# Patient Record
Sex: MALE | Race: WHITE | Employment: FULL TIME | URBAN - NONMETROPOLITAN AREA
[De-identification: names, ages, dates, MRNs, and addresses within clinical notes are randomized per-mention and may not be internally consistent; named-entity substitution may affect disease eponyms.]

---

## 2019-02-05 ENCOUNTER — OFFICE VISIT (OUTPATIENT)
Dept: UROLOGY | Age: 64
End: 2019-02-05
Payer: COMMERCIAL

## 2019-02-05 VITALS
HEIGHT: 70 IN | WEIGHT: 209 LBS | DIASTOLIC BLOOD PRESSURE: 70 MMHG | BODY MASS INDEX: 29.92 KG/M2 | SYSTOLIC BLOOD PRESSURE: 132 MMHG

## 2019-02-05 DIAGNOSIS — R97.20 ELEVATED PSA: Primary | ICD-10-CM

## 2019-02-05 LAB
BILIRUBIN URINE: NEGATIVE
BLOOD URINE, POC: NEGATIVE
CHARACTER, URINE: CLEAR
COLOR, URINE: YELLOW
GLUCOSE URINE: NEGATIVE MG/DL
KETONES, URINE: NEGATIVE
LEUKOCYTE CLUMPS, URINE: NEGATIVE
NITRITE, URINE: NEGATIVE
PH, URINE: 5.5
PROTEIN, URINE: NEGATIVE MG/DL
SPECIFIC GRAVITY, URINE: 1.01 (ref 1–1.03)
UROBILINOGEN, URINE: 0.2 EU/DL

## 2019-02-05 PROCEDURE — 99202 OFFICE O/P NEW SF 15 MIN: CPT | Performed by: UROLOGY

## 2019-02-05 PROCEDURE — 81003 URINALYSIS AUTO W/O SCOPE: CPT | Performed by: UROLOGY

## 2019-02-05 ASSESSMENT — ENCOUNTER SYMPTOMS
CHEST TIGHTNESS: 1
EYE PAIN: 0
PHOTOPHOBIA: 0
VOMITING: 0
SHORTNESS OF BREATH: 0
NAUSEA: 0

## 2021-03-04 ENCOUNTER — IMMUNIZATION (OUTPATIENT)
Dept: PRIMARY CARE CLINIC | Age: 66
End: 2021-03-04
Payer: COMMERCIAL

## 2021-03-04 PROCEDURE — 0001A COVID-19, PFIZER VACCINE 30MCG/0.3ML DOSE: CPT

## 2021-03-04 PROCEDURE — 91300 COVID-19, PFIZER VACCINE 30MCG/0.3ML DOSE: CPT

## 2021-03-25 ENCOUNTER — IMMUNIZATION (OUTPATIENT)
Dept: PRIMARY CARE CLINIC | Age: 66
End: 2021-03-25
Payer: COMMERCIAL

## 2021-03-25 PROCEDURE — 0002A COVID-19, PFIZER VACCINE 30MCG/0.3ML DOSE: CPT

## 2021-03-25 PROCEDURE — 91300 COVID-19, PFIZER VACCINE 30MCG/0.3ML DOSE: CPT

## 2021-11-15 PROBLEM — N40.0 BENIGN PROSTATIC HYPERPLASIA: Status: ACTIVE | Noted: 2021-11-15

## 2023-04-19 ENCOUNTER — HOSPITAL ENCOUNTER (OUTPATIENT)
Age: 68
Discharge: HOME | End: 2023-04-19
Payer: MEDICARE

## 2023-04-19 ENCOUNTER — HOSPITAL ENCOUNTER (OUTPATIENT)
Dept: GENERAL RADIOLOGY | Age: 68
Discharge: HOME | End: 2023-04-19
Payer: MEDICARE

## 2023-04-19 DIAGNOSIS — M25.562 ACUTE PAIN OF LEFT KNEE: ICD-10-CM

## 2023-04-19 PROCEDURE — 73564 X-RAY EXAM KNEE 4 OR MORE: CPT

## 2023-08-18 ENCOUNTER — HOSPITAL ENCOUNTER (OUTPATIENT)
Age: 68
Discharge: HOME | End: 2023-08-18
Payer: MEDICARE

## 2023-08-18 LAB
ANION GAP SERPL CALC-SCNC: 11 MEQ/L (ref 8–16)
BASOPHILS ABSOLUTE: 0 THOU/MM3 (ref 0–0.1)
BASOPHILS NFR BLD AUTO: 0.4 %
BUN SERPL-MCNC: 18 MG/DL (ref 7–22)
CALCIUM SERPL-MCNC: 9.2 MG/DL (ref 8.5–10.5)
CHLORIDE SERPL-SCNC: 103 MEQ/L (ref 98–111)
CO2 SERPL-SCNC: 25 MEQ/L (ref 23–33)
CREAT SERPL-MCNC: 1 MG/DL (ref 0.4–1.2)
DEPRECATED RDW RBC AUTO: 42.9 FL (ref 35–45)
EKG ATRIAL RATE: 48 BPM
EKG P AXIS: 29 DEGREES
EKG P-R INTERVAL: 158 MS
EKG Q-T INTERVAL: 426 MS
EKG QRS DURATION: 94 MS
EKG QTC CALCULATION (BAZETT): 380 MS
EKG R AXIS: 0 DEGREES
EKG T AXIS: 8 DEGREES
EKG VENTRICULAR RATE: 48 BPM
EOSINOPHIL NFR BLD AUTO: 1.6 %
EOSINOPHILS ABSOLUTE: 0.1 THOU/MM3 (ref 0–0.4)
ERYTHROCYTE [DISTWIDTH] IN BLOOD BY AUTOMATED COUNT: 13.1 % (ref 11.5–14.5)
GFR SERPL CREATININE-BSD FRML MDRD: > 60 ML/MIN/1.73M2
GLUCOSE SERPL-MCNC: 77 MG/DL (ref 70–108)
HCT VFR BLD AUTO: 43.9 % (ref 42–52)
HGB BLD-MCNC: 14.1 GM/DL (ref 14–18)
IMM GRANULOCYTES # BLD AUTO: 0.01 THOU/MM3 (ref 0–0.07)
IMM GRANULOCYTES NFR BLD AUTO: 0.2 %
LYMPHOCYTES ABSOLUTE: 1.2 THOU/MM3 (ref 1–4.8)
LYMPHOCYTES NFR BLD AUTO: 23.5 %
MCH RBC QN AUTO: 28.5 PG (ref 26–33)
MCHC RBC AUTO-ENTMCNC: 32.1 GM/DL (ref 32.2–35.5)
MCV RBC AUTO: 88.9 FL (ref 80–94)
MONOCYTES ABSOLUTE: 0.9 THOU/MM3 (ref 0.4–1.3)
MONOCYTES NFR BLD AUTO: 18.2 %
NEUTROPHILS NFR BLD AUTO: 56.1 %
NRBC BLD AUTO-RTO: 0 /100 WBC
PLATELET # BLD AUTO: 193 THOU/MM3 (ref 130–400)
PMV BLD AUTO: 11.3 FL (ref 9.4–12.4)
POTASSIUM SERPL-SCNC: 4.5 MEQ/L (ref 3.5–5.2)
RBC # BLD AUTO: 4.94 MILL/MM3 (ref 4.7–6.1)
SEGMENTED NEUTROPHILS ABSOLUTE COUNT: 2.7 THOU/MM3 (ref 1.8–7.7)
SODIUM SERPL-SCNC: 139 MEQ/L (ref 135–145)
WBC # BLD AUTO: 4.9 THOU/MM3 (ref 4.8–10.8)

## 2023-08-18 PROCEDURE — 80048 BASIC METABOLIC PNL TOTAL CA: CPT

## 2023-08-18 PROCEDURE — 93005 ELECTROCARDIOGRAM TRACING: CPT | Performed by: NURSE PRACTITIONER

## 2023-08-18 PROCEDURE — 36415 COLL VENOUS BLD VENIPUNCTURE: CPT

## 2023-08-18 PROCEDURE — 93010 ELECTROCARDIOGRAM REPORT: CPT | Performed by: INTERNAL MEDICINE

## 2023-08-18 PROCEDURE — 85025 COMPLETE CBC W/AUTO DIFF WBC: CPT

## 2023-08-26 LAB
EKG ATRIAL RATE: 48 BPM
EKG P AXIS: 29 DEGREES
EKG P-R INTERVAL: 158 MS
EKG Q-T INTERVAL: 426 MS
EKG QRS DURATION: 94 MS
EKG QTC CALCULATION (BAZETT): 380 MS
EKG R AXIS: 0 DEGREES
EKG T AXIS: 8 DEGREES
EKG VENTRICULAR RATE: 48 BPM

## 2024-03-13 ENCOUNTER — HOSPITAL ENCOUNTER (INPATIENT)
Age: 69
LOS: 3 days | Discharge: HOME | DRG: 315 | End: 2024-03-16
Attending: EMERGENCY MEDICINE
Payer: MEDICARE

## 2024-03-13 ENCOUNTER — APPOINTMENT (OUTPATIENT)
Dept: CT IMAGING | Age: 69
DRG: 315 | End: 2024-03-13
Payer: MEDICARE

## 2024-03-13 DIAGNOSIS — I31.39 PERICARDIAL EFFUSION: ICD-10-CM

## 2024-03-13 DIAGNOSIS — I30.1 ACUTE VIRAL PERICARDITIS: ICD-10-CM

## 2024-03-13 DIAGNOSIS — R07.9 CHEST PAIN, UNSPECIFIED TYPE: Primary | ICD-10-CM

## 2024-03-13 PROBLEM — I30.9 ACUTE PERICARDITIS: Status: ACTIVE | Noted: 2024-03-13

## 2024-03-13 LAB
ALBUMIN SERPL BCG-MCNC: 3.6 G/DL (ref 3.5–5.1)
ALP SERPL-CCNC: 99 U/L (ref 38–126)
ALT SERPL W/O P-5'-P-CCNC: 35 U/L (ref 11–66)
ANION GAP SERPL CALC-SCNC: 12 MEQ/L (ref 8–16)
AST SERPL-CCNC: 25 U/L (ref 5–40)
BASOPHILS ABSOLUTE: 0 THOU/MM3 (ref 0–0.1)
BASOPHILS NFR BLD AUTO: 0.4 %
BILIRUB CONJ SERPL-MCNC: < 0.2 MG/DL (ref 0–0.3)
BILIRUB SERPL-MCNC: 0.6 MG/DL (ref 0.3–1.2)
BUN SERPL-MCNC: 17 MG/DL (ref 7–22)
CALCIUM SERPL-MCNC: 9 MG/DL (ref 8.5–10.5)
CHLORIDE SERPL-SCNC: 104 MEQ/L (ref 98–111)
CO2 SERPL-SCNC: 24 MEQ/L (ref 23–33)
CREAT SERPL-MCNC: 1.2 MG/DL (ref 0.4–1.2)
CRP SERPL-MCNC: 16.59 MG/DL (ref 0–1)
DEPRECATED RDW RBC AUTO: 39.9 FL (ref 35–45)
EKG ATRIAL RATE: 62 BPM
EKG P AXIS: 17 DEGREES
EKG P-R INTERVAL: 164 MS
EKG Q-T INTERVAL: 384 MS
EKG QRS DURATION: 88 MS
EKG QTC CALCULATION (BAZETT): 389 MS
EKG R AXIS: 21 DEGREES
EKG T AXIS: 87 DEGREES
EKG VENTRICULAR RATE: 62 BPM
EOSINOPHIL NFR BLD AUTO: 2.9 %
EOSINOPHILS ABSOLUTE: 0.2 THOU/MM3 (ref 0–0.4)
ERYTHROCYTE [DISTWIDTH] IN BLOOD BY AUTOMATED COUNT: 12.5 % (ref 11.5–14.5)
ERYTHROCYTE [SEDIMENTATION RATE] IN BLOOD BY WESTERGREN METHOD: 79 MM/HR (ref 0–10)
FLUAV RNA RESP QL NAA+PROBE: NOT DETECTED
FLUBV RNA RESP QL NAA+PROBE: NOT DETECTED
GFR SERPL CREATININE-BSD FRML MDRD: > 60 ML/MIN/1.73M2
GLUCOSE SERPL-MCNC: 123 MG/DL (ref 70–108)
HCT VFR BLD AUTO: 37.7 % (ref 42–52)
HGB BLD-MCNC: 12.3 GM/DL (ref 14–18)
IMM GRANULOCYTES # BLD AUTO: 0.03 THOU/MM3 (ref 0–0.07)
IMM GRANULOCYTES NFR BLD AUTO: 0.4 %
LIPASE SERPL-CCNC: 39.3 U/L (ref 5.6–51.3)
LYMPHOCYTES ABSOLUTE: 0.9 THOU/MM3 (ref 1–4.8)
LYMPHOCYTES NFR BLD AUTO: 10 %
MCH RBC QN AUTO: 28.4 PG (ref 26–33)
MCHC RBC AUTO-ENTMCNC: 32.6 GM/DL (ref 32.2–35.5)
MCV RBC AUTO: 87.1 FL (ref 80–94)
MONOCYTES ABSOLUTE: 1.3 THOU/MM3 (ref 0.4–1.3)
MONOCYTES NFR BLD AUTO: 14.8 %
NEUTROPHILS NFR BLD AUTO: 71.5 %
NRBC BLD AUTO-RTO: 0 /100 WBC
NT-PROBNP SERPL IA-MCNC: 1190 PG/ML (ref 0–124)
OSMOLALITY SERPL CALC.SUM OF ELEC: 282.3 MOSMOL/KG (ref 275–300)
PLATELET # BLD AUTO: 319 THOU/MM3 (ref 130–400)
PMV BLD AUTO: 9.7 FL (ref 9.4–12.4)
POTASSIUM SERPL-SCNC: 4 MEQ/L (ref 3.5–5.2)
PROT SERPL-MCNC: 6.8 G/DL (ref 6.1–8)
RBC # BLD AUTO: 4.33 MILL/MM3 (ref 4.7–6.1)
SARS-COV-2 RNA RESP QL NAA+PROBE: NOT DETECTED
SEGMENTED NEUTROPHILS ABSOLUTE COUNT: 6.1 THOU/MM3 (ref 1.8–7.7)
SODIUM SERPL-SCNC: 140 MEQ/L (ref 135–145)
TROPONIN, HIGH SENSITIVITY: 15 NG/L (ref 0–12)
TROPONIN, HIGH SENSITIVITY: 17 NG/L (ref 0–12)
WBC # BLD AUTO: 8.5 THOU/MM3 (ref 4.8–10.8)

## 2024-03-13 PROCEDURE — 99285 EMERGENCY DEPT VISIT HI MDM: CPT

## 2024-03-13 PROCEDURE — 6360000002 HC RX W HCPCS

## 2024-03-13 PROCEDURE — 6360000004 HC RX CONTRAST MEDICATION: Performed by: EMERGENCY MEDICINE

## 2024-03-13 PROCEDURE — 83880 ASSAY OF NATRIURETIC PEPTIDE: CPT

## 2024-03-13 PROCEDURE — 2580000003 HC RX 258: Performed by: NURSE PRACTITIONER

## 2024-03-13 PROCEDURE — 93010 ELECTROCARDIOGRAM REPORT: CPT | Performed by: INTERNAL MEDICINE

## 2024-03-13 PROCEDURE — 87070 CULTURE OTHR SPECIMN AEROBIC: CPT

## 2024-03-13 PROCEDURE — 71275 CT ANGIOGRAPHY CHEST: CPT

## 2024-03-13 PROCEDURE — 84484 ASSAY OF TROPONIN QUANT: CPT

## 2024-03-13 PROCEDURE — 99223 1ST HOSP IP/OBS HIGH 75: CPT | Performed by: NURSE PRACTITIONER

## 2024-03-13 PROCEDURE — 82248 BILIRUBIN DIRECT: CPT

## 2024-03-13 PROCEDURE — 87641 MR-STAPH DNA AMP PROBE: CPT

## 2024-03-13 PROCEDURE — 87636 SARSCOV2 & INF A&B AMP PRB: CPT

## 2024-03-13 PROCEDURE — 85651 RBC SED RATE NONAUTOMATED: CPT

## 2024-03-13 PROCEDURE — 80053 COMPREHEN METABOLIC PANEL: CPT

## 2024-03-13 PROCEDURE — 96374 THER/PROPH/DIAG INJ IV PUSH: CPT

## 2024-03-13 PROCEDURE — 86140 C-REACTIVE PROTEIN: CPT

## 2024-03-13 PROCEDURE — 6360000002 HC RX W HCPCS: Performed by: NURSE PRACTITIONER

## 2024-03-13 PROCEDURE — 2060000000 HC ICU INTERMEDIATE R&B

## 2024-03-13 PROCEDURE — 36415 COLL VENOUS BLD VENIPUNCTURE: CPT

## 2024-03-13 PROCEDURE — 85025 COMPLETE CBC W/AUTO DIFF WBC: CPT

## 2024-03-13 PROCEDURE — 93005 ELECTROCARDIOGRAM TRACING: CPT

## 2024-03-13 PROCEDURE — 83690 ASSAY OF LIPASE: CPT

## 2024-03-13 RX ORDER — POTASSIUM CHLORIDE 7.45 MG/ML
10 INJECTION INTRAVENOUS PRN
Status: DISCONTINUED | OUTPATIENT
Start: 2024-03-13 | End: 2024-03-16 | Stop reason: HOSPADM

## 2024-03-13 RX ORDER — TAMSULOSIN HYDROCHLORIDE 0.4 MG/1
0.4 CAPSULE ORAL NIGHTLY
Status: DISCONTINUED | OUTPATIENT
Start: 2024-03-13 | End: 2024-03-16 | Stop reason: HOSPADM

## 2024-03-13 RX ORDER — POLYETHYLENE GLYCOL 3350 17 G/17G
17 POWDER, FOR SOLUTION ORAL DAILY PRN
Status: DISCONTINUED | OUTPATIENT
Start: 2024-03-13 | End: 2024-03-16 | Stop reason: HOSPADM

## 2024-03-13 RX ORDER — KETOROLAC TROMETHAMINE 30 MG/ML
15 INJECTION, SOLUTION INTRAMUSCULAR; INTRAVENOUS ONCE
Status: COMPLETED | OUTPATIENT
Start: 2024-03-13 | End: 2024-03-13

## 2024-03-13 RX ORDER — MAGNESIUM SULFATE IN WATER 40 MG/ML
2000 INJECTION, SOLUTION INTRAVENOUS PRN
Status: DISCONTINUED | OUTPATIENT
Start: 2024-03-13 | End: 2024-03-16 | Stop reason: HOSPADM

## 2024-03-13 RX ORDER — ACETAMINOPHEN 650 MG/1
650 SUPPOSITORY RECTAL EVERY 6 HOURS PRN
Status: DISCONTINUED | OUTPATIENT
Start: 2024-03-13 | End: 2024-03-16 | Stop reason: HOSPADM

## 2024-03-13 RX ORDER — ONDANSETRON 4 MG/1
4 TABLET, ORALLY DISINTEGRATING ORAL EVERY 8 HOURS PRN
Status: DISCONTINUED | OUTPATIENT
Start: 2024-03-13 | End: 2024-03-16 | Stop reason: HOSPADM

## 2024-03-13 RX ORDER — ACETAMINOPHEN 325 MG/1
650 TABLET ORAL EVERY 6 HOURS PRN
Status: DISCONTINUED | OUTPATIENT
Start: 2024-03-13 | End: 2024-03-16 | Stop reason: HOSPADM

## 2024-03-13 RX ORDER — POTASSIUM CHLORIDE 20 MEQ/1
40 TABLET, EXTENDED RELEASE ORAL PRN
Status: DISCONTINUED | OUTPATIENT
Start: 2024-03-13 | End: 2024-03-16 | Stop reason: HOSPADM

## 2024-03-13 RX ORDER — TEMAZEPAM 15 MG/1
15 CAPSULE ORAL NIGHTLY PRN
Status: DISCONTINUED | OUTPATIENT
Start: 2024-03-13 | End: 2024-03-16 | Stop reason: HOSPADM

## 2024-03-13 RX ORDER — ONDANSETRON 2 MG/ML
4 INJECTION INTRAMUSCULAR; INTRAVENOUS EVERY 6 HOURS PRN
Status: DISCONTINUED | OUTPATIENT
Start: 2024-03-13 | End: 2024-03-16 | Stop reason: HOSPADM

## 2024-03-13 RX ORDER — PANTOPRAZOLE SODIUM 40 MG/1
40 TABLET, DELAYED RELEASE ORAL
Status: DISCONTINUED | OUTPATIENT
Start: 2024-03-14 | End: 2024-03-16 | Stop reason: HOSPADM

## 2024-03-13 RX ORDER — ENOXAPARIN SODIUM 100 MG/ML
40 INJECTION SUBCUTANEOUS NIGHTLY
Status: DISCONTINUED | OUTPATIENT
Start: 2024-03-13 | End: 2024-03-16 | Stop reason: HOSPADM

## 2024-03-13 RX ORDER — SODIUM CHLORIDE 0.9 % (FLUSH) 0.9 %
5-40 SYRINGE (ML) INJECTION EVERY 12 HOURS SCHEDULED
Status: DISCONTINUED | OUTPATIENT
Start: 2024-03-13 | End: 2024-03-16 | Stop reason: HOSPADM

## 2024-03-13 RX ORDER — SODIUM CHLORIDE 9 MG/ML
INJECTION, SOLUTION INTRAVENOUS PRN
Status: DISCONTINUED | OUTPATIENT
Start: 2024-03-13 | End: 2024-03-16 | Stop reason: HOSPADM

## 2024-03-13 RX ORDER — SODIUM CHLORIDE 0.9 % (FLUSH) 0.9 %
5-40 SYRINGE (ML) INJECTION PRN
Status: DISCONTINUED | OUTPATIENT
Start: 2024-03-13 | End: 2024-03-16 | Stop reason: HOSPADM

## 2024-03-13 RX ORDER — IBUPROFEN 800 MG/1
800 TABLET ORAL
Status: DISCONTINUED | OUTPATIENT
Start: 2024-03-14 | End: 2024-03-16 | Stop reason: HOSPADM

## 2024-03-13 RX ADMIN — SODIUM CHLORIDE, PRESERVATIVE FREE 10 ML: 5 INJECTION INTRAVENOUS at 22:06

## 2024-03-13 RX ADMIN — KETOROLAC TROMETHAMINE 15 MG: 30 INJECTION INTRAMUSCULAR; INTRAVENOUS at 18:14

## 2024-03-13 RX ADMIN — IOPAMIDOL 80 ML: 755 INJECTION, SOLUTION INTRAVENOUS at 15:11

## 2024-03-13 RX ADMIN — ENOXAPARIN SODIUM 40 MG: 100 INJECTION SUBCUTANEOUS at 21:57

## 2024-03-13 ASSESSMENT — PAIN SCALES - GENERAL
PAINLEVEL_OUTOF10: 1
PAINLEVEL_OUTOF10: 4
PAINLEVEL_OUTOF10: 7

## 2024-03-13 ASSESSMENT — PAIN - FUNCTIONAL ASSESSMENT
PAIN_FUNCTIONAL_ASSESSMENT: 0-10
PAIN_FUNCTIONAL_ASSESSMENT: 0-10

## 2024-03-13 ASSESSMENT — HEART SCORE
ECG: 0
ECG: NORMAL

## 2024-03-13 ASSESSMENT — PAIN DESCRIPTION - LOCATION: LOCATION: CHEST

## 2024-03-13 NOTE — H&P
Hospitalist History & Physical    Patient:  Case Etienne    Unit/Bed:04/004A  YOB: 1955  MRN: [id number]   Acct: [id number]   PCP: Gabino Kelley MD  Code Status: No Order    Date of Service: Pt seen/examined on 03/13/24 and admitted to Inpatient with expected LOS greater than two midnights due to medical therapy.     Chief Complaint: Orthopnea, dyspnea with exertion    Assessment/Plan:  Chest pain  Orthopnea  Dyspnea   Pericardial effusion suspected acute pericarditis  -chest pain initially about 1.5 weeks ago following strenuous exercise. Radiated to neck and shoulders. Chest pain improved however he is having persistent orthopnea and exertional dyspnea. No fever but does endorse night sweats. Symptoms impove with sitting upright.  -/60, HR 60-70  -Trop 17/15, BNP 1190, ESR 79, CRP 16.59.   -CTA chest: negative for pulmonary embolism, small bilateral pleural effusions with adjacent atelectasis/infiltrate, pericardial effusion.  -EKG: Sinus rhythm with marked sinus arrhythmia  Nonspecific T wave abnormality  -discussed above with Dr Linares, cardiology consulted.  -high dose NSAIDS  -Echo ordered    BPH  -cont flomax    DVT prophylaxis: Lovenox   Code status: total support    History of Present Illness:  Case Etienne is a 68 y.o. male with PMHx that includes: BPH who presented to [facility] with chief complaint of orthopnea, dyspnea with exertion.  Patient states he has been in his normal state of health.  Works out frequently, very active.  Reports COVID infection in January with some loss of taste that has resolved.  About 1 and half weeks ago patient was doing strenuous upper body exercises.  He developed chest pain just above the nipple line across to his chest that was sharp radiated to his neck and shoulders.  He became concerned when symptoms were not resolving hence planned to come to the emergency department however he took a shower and the chest pain resolved.  In the interim he is

## 2024-03-13 NOTE — ED PROVIDER NOTES
[facility] EMERGENCY DEPT  EMERGENCY DEPARTMENT ENCOUNTER          Pt Name: Case Etienne  MRN: [id number]  Birthdate 1955  Date of evaluation: 3/13/2024  Physician: Mode Toscano MD  Supervising Attending Physician: Carolina Brown APRN *       CHIEF COMPLAINT       Chief Complaint   Patient presents with    Chest Pain         HISTORY OF PRESENT ILLNESS    HPI  Case Etienne is a 68 y.o. male who presents to the emergency department from home, as a walk in to the ED lobby for evaluation of chest pain and shortness of breath.  Patient reports that about 10 days ago while working out he began to have some pain in his neck and into the left side of his chest.  Patient reports that he ended his workout and the pain subsided.  Patient reports that that he would occasionally still have this pain minimally.  Patient reports that 4 days ago while at home he developed a severe pain in the left side of his chest.  He stated that it is much worse when laying back and laying on his left side.  Patient reports that he got up and got a shower which had relief of the pain.  Patient states that yesterday evening keep he developed the pain while sitting on the couch and began to sweat profusely.  Patient reports that he became nauseous at that time but did not vomit.  Patient reports that he walked down a hill at his house however and was very short of breath.  Patient states that he is able to walk up the stairs fine in his home.  Patient denies the pain being worse with exertion versus while sitting still.  Patient denies ever having this Pain before.  Patient reports that he was seen by his PCP few days ago and was diagnosed with a muscular strain and started on meloxicam and tizanidine.  He denies any recent fever or chills.  Denies any cough.  Patient does report worsening chest pain when taking deep breath.  Patient does report a history of blood clots in the family.  The patient has no other acute        Physical Exam  Vitals and nursing note reviewed.   Constitutional:       General: He is not in acute distress.     Appearance: Normal appearance.   HENT:      Head: Normocephalic and atraumatic.      Nose: Nose normal.      Mouth/Throat:      Mouth: Mucous membranes are moist.      Pharynx: Oropharynx is clear.   Eyes:      Extraocular Movements: Extraocular movements intact.   Cardiovascular:      Rate and Rhythm: Normal rate.      Pulses: Normal pulses.      Heart sounds: Normal heart sounds. No murmur heard.  Pulmonary:      Effort: Pulmonary effort is normal. No respiratory distress.      Breath sounds: Normal breath sounds. No wheezing.   Chest:      Chest wall: No tenderness.   Abdominal:      Palpations: Abdomen is soft.      Tenderness: There is abdominal tenderness in the epigastric area. There is no guarding or rebound. Negative signs include James's sign.   Musculoskeletal:         General: Normal range of motion.      Cervical back: Normal range of motion.      Right lower leg: No edema.      Left lower leg: No edema.   Skin:     General: Skin is warm.      Capillary Refill: Capillary refill takes less than 2 seconds.   Neurological:      General: No focal deficit present.      Mental Status: He is alert and oriented to person, place, and time.   Psychiatric:         Mood and Affect: Mood normal.         Behavior: Behavior normal.           ED RESULTS   Laboratory results (none if blank):  Labs Reviewed   CBC WITH AUTO DIFFERENTIAL - Abnormal; Notable for the following components:       Result Value    RBC 4.33 (*)     Hemoglobin 12.3 (*)     Hematocrit 37.7 (*)     Lymphocytes Absolute 0.9 (*)     All other components within normal limits   BASIC METABOLIC PANEL - Abnormal; Notable for the following components:    Glucose 123 (*)     All other components within normal limits   TROPONIN - Abnormal; Notable for the following components:    Troponin, High Sensitivity 17 (*)     All other components

## 2024-03-13 NOTE — ED TRIAGE NOTES
Pt to ED with chest pain x1 week. Pt states that with exertion the pain will go to his left shoulder. Pt states that he saw his pcp who prescribed him muscle relaxants. Pt denies any dizziness or nausea. Pt states that laying on his left side makes the pain worse. EKG done.

## 2024-03-13 NOTE — ED NOTES
Pt resting on cot. No distress noted. Pt states that he is starting to get uncomfortable pains in his chest. Pt does not want pain medication right now however states that he will need then when it is time to sleep. Call light in reach.

## 2024-03-13 NOTE — ED NOTES
ED nurse-to-nurse bedside report    Chief Complaint   Patient presents with    Chest Pain      LOC: alert and orientated to name, place, date  Vital signs   Vitals:    03/13/24 1348 03/13/24 1500 03/13/24 1645 03/13/24 1801   BP: (!) 144/68 139/75 136/72 (!) 145/59   Pulse: 64 56 59 75   Resp: 16 28 27 18   Temp: 97.9 °F (36.6 °C)      SpO2: 96% 96% 96% 95%   Weight: 97.1 kg (214 lb)         Pain:    Pain Interventions: see MAR  Pain Goal: 4  Oxygen: No    Current needs required RA   Telemetry: Yes  LDAs:   Peripheral IV 03/13/24 Distal;Left Cephalic (Active)   Site Assessment Clean, dry & intact 03/13/24 1350   Line Status Blood return noted;Flushed;Specimen collected 03/13/24 1350   Phlebitis Assessment No symptoms 03/13/24 1350   Infiltration Assessment 0 03/13/24 1350   Dressing Status Clean, dry & intact 03/13/24 1350     Continuous Infusions:   Mobility: Independent  Centeon Fall Risk Score:       2/23/2024     1:18 PM 12/14/2022     9:45 AM   Fall Risk   2 or more falls in past year? no no   Fall with injury in past year? no no     Fall Interventions: call light in reach, bed in low position with wheels locked, side rails up x2  Report given to: GLENDA Escoto

## 2024-03-14 ENCOUNTER — APPOINTMENT (OUTPATIENT)
Age: 69
DRG: 315 | End: 2024-03-14
Attending: NURSE PRACTITIONER
Payer: MEDICARE

## 2024-03-14 PROBLEM — I31.39 PERICARDIAL EFFUSION: Status: ACTIVE | Noted: 2024-03-14

## 2024-03-14 PROBLEM — R07.9 CHEST PAIN: Status: ACTIVE | Noted: 2024-03-14

## 2024-03-14 LAB
ALBUMIN SERPL BCG-MCNC: 3.5 G/DL (ref 3.5–5.1)
ANION GAP SERPL CALC-SCNC: 16 MEQ/L (ref 8–16)
BASOPHILS ABSOLUTE: 0 THOU/MM3 (ref 0–0.1)
BASOPHILS NFR BLD AUTO: 0.2 %
BUN SERPL-MCNC: 16 MG/DL (ref 7–22)
CALCIUM SERPL-MCNC: 8.6 MG/DL (ref 8.5–10.5)
CHLORIDE SERPL-SCNC: 103 MEQ/L (ref 98–111)
CO2 SERPL-SCNC: 22 MEQ/L (ref 23–33)
CREAT SERPL-MCNC: 1.1 MG/DL (ref 0.4–1.2)
DEPRECATED RDW RBC AUTO: 39.3 FL (ref 35–45)
ECHO AO ASC DIAM: 3.6 CM
ECHO AV CUSP MM: 2 CM
ECHO AV PEAK GRADIENT: 12 MMHG
ECHO AV PEAK VELOCITY: 1.7 M/S
ECHO AV VELOCITY RATIO: 0.71
ECHO EST RA PRESSURE: 10 MMHG
ECHO IVC PROX: 2 CM
ECHO LA AREA 2C: 17.7 CM2
ECHO LA AREA 4C: 15.6 CM2
ECHO LA DIAMETER: 3.3 CM
ECHO LA MAJOR AXIS: 5.3 CM
ECHO LA MINOR AXIS: 5.2 CM
ECHO LA VOL BP: 42 ML (ref 18–58)
ECHO LA VOL MOD A2C: 49 ML (ref 18–58)
ECHO LA VOL MOD A4C: 35 ML (ref 18–58)
ECHO LV E' LATERAL VELOCITY: 9 CM/S
ECHO LV E' SEPTAL VELOCITY: 9 CM/S
ECHO LV FRACTIONAL SHORTENING: 27 % (ref 28–44)
ECHO LV INTERNAL DIMENSION DIASTOLIC: 4.5 CM (ref 4.2–5.9)
ECHO LV INTERNAL DIMENSION SYSTOLIC: 3.3 CM
ECHO LV ISOVOLUMETRIC RELAXATION TIME (IVRT): 70 MS
ECHO LV IVSD: 1.1 CM (ref 0.6–1)
ECHO LV MASS 2D: 175 G (ref 88–224)
ECHO LV POSTERIOR WALL DIASTOLIC: 1.1 CM (ref 0.6–1)
ECHO LV RELATIVE WALL THICKNESS RATIO: 0.49
ECHO LVOT PEAK GRADIENT: 6 MMHG
ECHO LVOT PEAK VELOCITY: 1.2 M/S
ECHO MV A VELOCITY: 0.79 M/S
ECHO MV E DECELERATION TIME (DT): 205 MS
ECHO MV E VELOCITY: 1.02 M/S
ECHO MV E/A RATIO: 1.29
ECHO MV E/E' LATERAL: 11.33
ECHO MV E/E' RATIO (AVERAGED): 11.33
ECHO MV REGURGITANT PEAK GRADIENT: 117 MMHG
ECHO MV REGURGITANT PEAK VELOCITY: 5.4 M/S
ECHO PULMONARY ARTERY END DIASTOLIC PRESSURE: 6 MMHG
ECHO PV MAX VELOCITY: 0.9 M/S
ECHO PV PEAK GRADIENT: 3 MMHG
ECHO PV REGURGITANT MAX VELOCITY: 1.2 M/S
ECHO RIGHT VENTRICULAR SYSTOLIC PRESSURE (RVSP): 44 MMHG
ECHO RV INTERNAL DIMENSION: 3.5 CM
ECHO RV TAPSE: 1.9 CM (ref 1.7–?)
ECHO TV E WAVE: 0.7 M/S
ECHO TV REGURGITANT MAX VELOCITY: 2.9 M/S
ECHO TV REGURGITANT PEAK GRADIENT: 34 MMHG
EOSINOPHIL NFR BLD AUTO: 2.7 %
EOSINOPHILS ABSOLUTE: 0.3 THOU/MM3 (ref 0–0.4)
ERYTHROCYTE [DISTWIDTH] IN BLOOD BY AUTOMATED COUNT: 12.6 % (ref 11.5–14.5)
GFR SERPL CREATININE-BSD FRML MDRD: > 60 ML/MIN/1.73M2
GLUCOSE SERPL-MCNC: 107 MG/DL (ref 70–108)
HCT VFR BLD AUTO: 36.8 % (ref 42–52)
HGB BLD-MCNC: 11.9 GM/DL (ref 14–18)
IMM GRANULOCYTES # BLD AUTO: 0.04 THOU/MM3 (ref 0–0.07)
IMM GRANULOCYTES NFR BLD AUTO: 0.4 %
INR PPP: 1.46 (ref 0.85–1.13)
LDH SERPL L TO P-CCNC: 246 U/L (ref 100–190)
LYMPHOCYTES ABSOLUTE: 1.1 THOU/MM3 (ref 1–4.8)
LYMPHOCYTES NFR BLD AUTO: 10.6 %
MCH RBC QN AUTO: 27.7 PG (ref 26–33)
MCHC RBC AUTO-ENTMCNC: 32.3 GM/DL (ref 32.2–35.5)
MCV RBC AUTO: 85.8 FL (ref 80–94)
MONOCYTES ABSOLUTE: 1.2 THOU/MM3 (ref 0.4–1.3)
MONOCYTES NFR BLD AUTO: 11.8 %
MRSA DNA SPEC QL NAA+PROBE: NEGATIVE
NEUTROPHILS NFR BLD AUTO: 74.3 %
NRBC BLD AUTO-RTO: 0 /100 WBC
PLATELET # BLD AUTO: 341 THOU/MM3 (ref 130–400)
PMV BLD AUTO: 9.7 FL (ref 9.4–12.4)
POTASSIUM SERPL-SCNC: 4.5 MEQ/L (ref 3.5–5.2)
PROT SERPL-MCNC: 7.3 G/DL (ref 6.1–8)
RBC # BLD AUTO: 4.29 MILL/MM3 (ref 4.7–6.1)
SEGMENTED NEUTROPHILS ABSOLUTE COUNT: 7.4 THOU/MM3 (ref 1.8–7.7)
SODIUM SERPL-SCNC: 141 MEQ/L (ref 135–145)
TSH SERPL DL<=0.005 MIU/L-ACNC: 1.81 UIU/ML (ref 0.4–4.2)
WBC # BLD AUTO: 10 THOU/MM3 (ref 4.8–10.8)

## 2024-03-14 PROCEDURE — 93306 TTE W/DOPPLER COMPLETE: CPT

## 2024-03-14 PROCEDURE — 6370000000 HC RX 637 (ALT 250 FOR IP)

## 2024-03-14 PROCEDURE — 85610 PROTHROMBIN TIME: CPT

## 2024-03-14 PROCEDURE — 36415 COLL VENOUS BLD VENIPUNCTURE: CPT

## 2024-03-14 PROCEDURE — 99232 SBSQ HOSP IP/OBS MODERATE 35: CPT | Performed by: INTERNAL MEDICINE

## 2024-03-14 PROCEDURE — 82040 ASSAY OF SERUM ALBUMIN: CPT

## 2024-03-14 PROCEDURE — 2580000003 HC RX 258: Performed by: NURSE PRACTITIONER

## 2024-03-14 PROCEDURE — 85025 COMPLETE CBC W/AUTO DIFF WBC: CPT

## 2024-03-14 PROCEDURE — 84443 ASSAY THYROID STIM HORMONE: CPT

## 2024-03-14 PROCEDURE — 80048 BASIC METABOLIC PNL TOTAL CA: CPT

## 2024-03-14 PROCEDURE — 84155 ASSAY OF PROTEIN SERUM: CPT

## 2024-03-14 PROCEDURE — 2580000003 HC RX 258

## 2024-03-14 PROCEDURE — 6370000000 HC RX 637 (ALT 250 FOR IP): Performed by: NURSE PRACTITIONER

## 2024-03-14 PROCEDURE — 87205 SMEAR GRAM STAIN: CPT

## 2024-03-14 PROCEDURE — 93306 TTE W/DOPPLER COMPLETE: CPT | Performed by: INTERNAL MEDICINE

## 2024-03-14 PROCEDURE — 99223 1ST HOSP IP/OBS HIGH 75: CPT | Performed by: INTERNAL MEDICINE

## 2024-03-14 PROCEDURE — 2060000000 HC ICU INTERMEDIATE R&B

## 2024-03-14 PROCEDURE — 83615 LACTATE (LD) (LDH) ENZYME: CPT

## 2024-03-14 RX ORDER — COLCHICINE 0.6 MG/1
1.2 TABLET ORAL 2 TIMES DAILY
Status: DISCONTINUED | OUTPATIENT
Start: 2024-03-14 | End: 2024-03-14

## 2024-03-14 RX ORDER — SODIUM CHLORIDE 9 MG/ML
INJECTION, SOLUTION INTRAVENOUS PRN
Status: DISCONTINUED | OUTPATIENT
Start: 2024-03-14 | End: 2024-03-16 | Stop reason: HOSPADM

## 2024-03-14 RX ORDER — LIDOCAINE 4 G/G
1 PATCH TOPICAL DAILY
Status: DISCONTINUED | OUTPATIENT
Start: 2024-03-14 | End: 2024-03-16 | Stop reason: HOSPADM

## 2024-03-14 RX ORDER — COLCHICINE 0.6 MG/1
1.2 TABLET ORAL 2 TIMES DAILY
Status: DISCONTINUED | OUTPATIENT
Start: 2024-03-14 | End: 2024-03-15

## 2024-03-14 RX ORDER — COLCHICINE 0.6 MG/1
1.2 CAPSULE ORAL 2 TIMES DAILY
Status: DISCONTINUED | OUTPATIENT
Start: 2024-03-14 | End: 2024-03-14

## 2024-03-14 RX ORDER — SODIUM CHLORIDE 0.9 % (FLUSH) 0.9 %
5-40 SYRINGE (ML) INJECTION EVERY 12 HOURS SCHEDULED
Status: DISCONTINUED | OUTPATIENT
Start: 2024-03-14 | End: 2024-03-16 | Stop reason: HOSPADM

## 2024-03-14 RX ORDER — SODIUM CHLORIDE 0.9 % (FLUSH) 0.9 %
5-40 SYRINGE (ML) INJECTION PRN
Status: DISCONTINUED | OUTPATIENT
Start: 2024-03-14 | End: 2024-03-16 | Stop reason: HOSPADM

## 2024-03-14 RX ORDER — LANOLIN ALCOHOL/MO/W.PET/CERES
6 CREAM (GRAM) TOPICAL NIGHTLY PRN
Status: DISCONTINUED | OUTPATIENT
Start: 2024-03-14 | End: 2024-03-16 | Stop reason: HOSPADM

## 2024-03-14 RX ADMIN — COLCHICINE 1.2 MG: 0.6 TABLET, FILM COATED ORAL at 16:56

## 2024-03-14 RX ADMIN — IBUPROFEN 800 MG: 800 TABLET, FILM COATED ORAL at 09:55

## 2024-03-14 RX ADMIN — SODIUM CHLORIDE, PRESERVATIVE FREE 10 ML: 5 INJECTION INTRAVENOUS at 21:08

## 2024-03-14 RX ADMIN — PANTOPRAZOLE SODIUM 40 MG: 40 TABLET, DELAYED RELEASE ORAL at 07:15

## 2024-03-14 RX ADMIN — IBUPROFEN 800 MG: 800 TABLET, FILM COATED ORAL at 18:22

## 2024-03-14 RX ADMIN — IBUPROFEN 800 MG: 800 TABLET, FILM COATED ORAL at 12:43

## 2024-03-14 RX ADMIN — Medication 6 MG: at 21:06

## 2024-03-14 RX ADMIN — SODIUM CHLORIDE, PRESERVATIVE FREE 10 ML: 5 INJECTION INTRAVENOUS at 09:56

## 2024-03-14 ASSESSMENT — PAIN SCALES - GENERAL
PAINLEVEL_OUTOF10: 0
PAINLEVEL_OUTOF10: 0

## 2024-03-14 NOTE — CONSULTS
[facility]'s  Consult    Patient's Name/Date of Birth: Case Etienne / 1955 (68 y.o.)    Date: March 14, 2024     Referring Provider: Sneha Gates MD    CHIEF COMPLAINT:  Pericardial effusion      HPI: This is a pleasant 68 y.o. male presents with orthopnea and dyspnea with exertion.  Normally a very active person and works out frequently.  COVID infection in January with loss of taste at that time that has improved. Also has grandchildren that he is around a lot.  Approximately 2 weeks ago patient developed chest pain across his chest that was sharp and radiated to the neck and shoulders.  The plan to come to the emergency department send symptoms not resolving however he took a shower and the chest pain resolved.  Still seems to notice intermittent chest/shoulder/neck pain and pain in the epigastric area.  However his most significant complaint is shortness of breath with exertion.  Unable to lie flat recently.  Feels like her heart is being squeezed and pounding when laying on his left side.      Initial vital signs within normal limits.  EKG shows sinus rhythm with sinus arrhythmia.  BMP benign.  CRP elevated at 16.59.  Sed rate elevated at 79.  Trended troponin 17, 15.  CBC shows mild normocytic anemia.  CTA chest negative for pulmonary embolism, however there is small bilateral pleural effusions with adjacent atelectasis/infiltrate.  There is also a moderate-sized pericardial effusion measuring up to 1.9 cm in width.      Echo: No results found for this or any previous visit.       All labs, EKG's, diagnostic testing and images as well as cardiac cath, stress testing were reviewed during this encounter    Past Medical History:   Diagnosis Date    BPH (benign prostatic hyperplasia)     SVT (supraventricular tachycardia)      Past Surgical History:   Procedure Laterality Date    WISDOM TOOTH EXTRACTION       Current Facility-Administered Medications   Medication Dose Route  Food in the Last Year: Patient declined   Transportation Needs: Patient Declined (3/13/2024)    PRAPARE - Transportation     Lack of Transportation (Medical): Patient declined     Lack of Transportation (Non-Medical): Patient declined   Physical Activity: Sufficiently Active (2/23/2024)    Exercise Vital Sign     Days of Exercise per Week: 5 days     Minutes of Exercise per Session: 60 min   Stress: Not on file   Social Connections: Not on file   Intimate Partner Violence: Not on file   Housing Stability: Patient Declined (3/13/2024)    Housing Stability Vital Sign     Unable to Pay for Housing in the Last Year: Patient declined     Number of Places Lived in the Last Year: 1     Unstable Housing in the Last Year: Patient declined     ROS:   As above      Labs:  CBC:   Recent Labs     03/13/24  1400 03/14/24  0515   WBC 8.5 10.0   HGB 12.3* 11.9*   HCT 37.7* 36.8*   MCV 87.1 85.8    341     BMP:   Recent Labs     03/13/24  1400 03/14/24  0515    141   K 4.0 4.5    103   CO2 24 22*   BUN 17 16   CREATININE 1.2 1.1     Accucheck Glucoses: No results for input(s): \"POCGLU\" in the last 72 hours.  Cardiac Enzymes: No results for input(s): \"CKTOTAL\", \"CKMB\", \"CKMBINDEX\", \"TROPONINI\" in the last 72 hours.  PT/INR: No results for input(s): \"PROTIME\", \"INR\" in the last 72 hours.  APTT: No results for input(s): \"APTT\" in the last 72 hours.  Liver Profile:  Lab Results   Component Value Date/Time    AST 25 03/13/2024 02:00 PM    ALT 35 03/13/2024 02:00 PM    BILIDIR <0.2 03/13/2024 02:00 PM    BILITOT 0.6 03/13/2024 02:00 PM    ALKPHOS 99 03/13/2024 02:00 PM     Lab Results   Component Value Date/Time    CHOL 159 02/26/2024 09:02 AM    HDL 37 02/26/2024 09:02 AM    TRIG 168 02/26/2024 09:02 AM     TSH: No results found for: \"TSH\"  UA:   Lab Results   Component Value Date/Time    NITRITE negative 02/26/2024 08:44 AM    COLORU yellow 06/28/2021 03:28 PM    PHUR 5.5 02/26/2024 08:44 AM    CLARITYU clear

## 2024-03-14 NOTE — CARE COORDINATION
3/14/24, 11:09 AM EDT      DISCHARGE PLANNING EVALUATION    Case Etienne  Admitted: 3/13/2024  Hospital Day: 1    Location: [address] [place], [place] [address] Reason for admit: Pericardial effusion [I31.39]  Acute pericarditis [I30.9]  Chest pain, unspecified type [R07.9]    Past Medical History:   Diagnosis Date    BPH (benign prostatic hyperplasia)     SVT (supraventricular tachycardia)      Barriers to Discharge:   Pericardial Effusion  3/15 Left Thoracentesis = 100 ml removed  Febrile last 24h      PCP: Gabino Kelley MD    Readmission Risk Low 0-14, Mod 15-19), High > 20: Readmission Risk Score: 7.2      Advance Directives:      Code Status: Full Code   Patient's Primary Decision Maker is: next of kin    Patient Goals/Plan/Treatment Preferences: plans home w spouse Sybil, still drives    Transportation/Food Security/Housekeeping Addressed: No issues identified.     If patient is discharged prior to next notation, then this note serves as note for discharge by case management.

## 2024-03-14 NOTE — PROGRESS NOTES
cheeks and chin area with a red hue.  Reports recently seen dentist, no dental issues. Denies c/o sore throat, nasal congestion, abdominal pain, wounds/open sores, nausea, dysuria, or diarrhea.      Initial ED evaluation: patient afebrile, HR 60, RR 22, /61, O2 sat 96% on room air. Significant lab findings include: No leukocytosis, troponin 17/15, BNP 1190, sed rate 79, CRP 16.59. Significant imaging findings include: Negative for pulmonary embolism, small bilateral pleural effusions with adjacent atelectasis/infiltrate, pericardial effusion measures up to 1.9 cm in width (see full reports). Patient received Toradol. Hospitalization advised for continue evaluation and medical management of pericardial effusion with suspected acute pericarditis.    \"    Subjective (past 24 hours): Patient continues to have chest pain.  Also reports difficulty with sleep that has not been relieved with any home sleep aids he has tried so far.  Will try Eletone tonight if desired.  Discussed thoracentesis with the patient plan for tomorrow and explained risks.  Patient expressed understanding all questions were answered.    ROS: reviewed complete ROS unchanged unless otherwise stated in hospital course/subjective portion.       Medications:  Reviewed    lidocaine, 1 patch, TransDERmal, Daily    colchicine, 1.2 mg, Oral, BID    sodium chloride flush, 5-40 mL, IntraVENous, 2 times per day    tamsulosin, 0.4 mg, Oral, Nightly    sodium chloride flush, 5-40 mL, IntraVENous, 2 times per day    enoxaparin, 40 mg, SubCUTAneous, Nightly    ibuprofen, 800 mg, Oral, TID WC    pantoprazole, 40 mg, Oral, QAM AC         Intake/Output Summary (Last 24 hours) at 3/14/2024 1553  Last data filed at 3/14/2024 1411  Gross per 24 hour   Intake 100 ml   Output 1200 ml   Net -1100 ml       Exam:  BP (!) 114/54   Pulse 84   Temp 98.1 °F (36.7 °C) (Oral)   Resp 16   Wt 97.1 kg (214 lb 0.4 oz)   SpO2 94%   BMI 30.27 kg/m²     General: No distress,  Code  PT/OT: no  Tele: yes  IVF: no    Electronically signed by Mode Lund MD PGY1 on 3/14/2024 at 3:53 PM    Case was discussed with Attending, Dr. Gates

## 2024-03-14 NOTE — PLAN OF CARE
Problem: Discharge Planning  Goal: Discharge to home or other facility with appropriate resources  Outcome: Progressing     Problem: Pain  Goal: Verbalizes/displays adequate comfort level or baseline comfort level  Outcome: Progressing     Problem: ABCDS Injury Assessment  Goal: Absence of physical injury  Outcome: Progressing     Problem: Cardiovascular - Adult  Goal: Maintains optimal cardiac output and hemodynamic stability  Outcome: Progressing  Goal: Absence of cardiac dysrhythmias or at baseline  Outcome: Progressing     Care plan reviewed with patient .  Patient  verbalize understanding of the plan of care and contribute to goal setting.

## 2024-03-14 NOTE — PROGRESS NOTES
Pharmacy Medication History Note    List of current medications patient is taking is complete.    Source of information: Patient, wife, and surescripts    Changes made to medication list:  Medications removed (include reason, ex. therapy complete or physician discontinued):  Meloxicam 7.5 mg- no longer taking  Tizanidine 2mg- Side effects (dry mouth)    Medications added/doses adjusted:  None    Other notes (ex. Recent course of antibiotics, Coumadin dosing):  Denies use of other OTC or herbal medications.    Allergies reviewed    Electronically signed by Aundrea Britt on 3/14/2024 at 10:06 AM

## 2024-03-14 NOTE — PROGRESS NOTES
Pt admitted to  4K12 from ED.     Complaints: Chest pain / discomfort.      IV none infusing into the antecubital left, condition patent and no redness at a rate of IV site free of s/s of infection or infiltration.     Vital signs obtained. Assessment and data collection initiated. Two nurse skin assessment performed by Belen DOSHI and Joann DOSHI.    Policies and procedures for  explained. All questions answered with no further questions at this time. Fall prevention and safety brochure discussed with patient.  Bed alarm on. Call light in reach. Oriented to room.

## 2024-03-14 NOTE — PLAN OF CARE
Problem: Discharge Planning  Goal: Discharge to home or other facility with appropriate resources  Outcome: Progressing  Flowsheets (Taken 3/14/2024 1758)  Discharge to home or other facility with appropriate resources:   Identify barriers to discharge with patient and caregiver   Arrange for needed discharge resources and transportation as appropriate   Identify discharge learning needs (meds, wound care, etc)   Refer to discharge planning if patient needs post-hospital services based on physician order or complex needs related to functional status, cognitive ability or social support system     Problem: Pain  Goal: Verbalizes/displays adequate comfort level or baseline comfort level  Outcome: Progressing  Flowsheets (Taken 3/14/2024 1758)  Verbalizes/displays adequate comfort level or baseline comfort level:   Encourage patient to monitor pain and request assistance   Assess pain using appropriate pain scale   Administer analgesics based on type and severity of pain and evaluate response   Implement non-pharmacological measures as appropriate and evaluate response   Consider cultural and social influences on pain and pain management   Notify Licensed Independent Practitioner if interventions unsuccessful or patient reports new pain     Problem: ABCDS Injury Assessment  Goal: Absence of physical injury  Outcome: Progressing  Flowsheets (Taken 3/14/2024 1758)  Absence of Physical Injury: Implement safety measures based on patient assessment     Problem: Cardiovascular - Adult  Goal: Maintains optimal cardiac output and hemodynamic stability  Outcome: Progressing  Flowsheets (Taken 3/14/2024 1758)  Maintains optimal cardiac output and hemodynamic stability:   Monitor blood pressure and heart rate   Monitor urine output and notify Licensed Independent Practitioner for values outside of normal range     Problem: Cardiovascular - Adult  Goal: Absence of cardiac dysrhythmias or at baseline  Outcome:

## 2024-03-15 ENCOUNTER — APPOINTMENT (OUTPATIENT)
Dept: GENERAL RADIOLOGY | Age: 69
DRG: 315 | End: 2024-03-15
Payer: MEDICARE

## 2024-03-15 ENCOUNTER — APPOINTMENT (OUTPATIENT)
Dept: ULTRASOUND IMAGING | Age: 69
DRG: 315 | End: 2024-03-15
Payer: MEDICARE

## 2024-03-15 LAB
ALBUMIN FLD-MCNC: 2 GM/DL
ANION GAP SERPL CALC-SCNC: 12 MEQ/L (ref 8–16)
BACTERIA SPEC AEROBE CULT: NORMAL
BUN SERPL-MCNC: 19 MG/DL (ref 7–22)
CALCIUM SERPL-MCNC: 8.9 MG/DL (ref 8.5–10.5)
CHARACTER, BODY FLUID: ABNORMAL
CHLORIDE SERPL-SCNC: 106 MEQ/L (ref 98–111)
CO2 SERPL-SCNC: 25 MEQ/L (ref 23–33)
COLOR FLD: ABNORMAL
CREAT SERPL-MCNC: 1.4 MG/DL (ref 0.4–1.2)
CRP SERPL-MCNC: 22.6 MG/DL (ref 0–1)
DEPRECATED RDW RBC AUTO: 40.1 FL (ref 35–45)
ERYTHROCYTE [DISTWIDTH] IN BLOOD BY AUTOMATED COUNT: 12.9 % (ref 11.5–14.5)
ERYTHROCYTE [SEDIMENTATION RATE] IN BLOOD BY WESTERGREN METHOD: 73 MM/HR (ref 0–10)
GFR SERPL CREATININE-BSD FRML MDRD: 55 ML/MIN/1.73M2
GLUCOSE FLD-MCNC: 112 MG/DL
GLUCOSE SERPL-MCNC: 128 MG/DL (ref 70–108)
GRANULOCYTES NFR FLD AUTO: 52.1 %
HCT VFR BLD AUTO: 36.6 % (ref 42–52)
HGB BLD-MCNC: 11.9 GM/DL (ref 14–18)
LDH FLD L TO P-CCNC: 260 U/L
MAGNESIUM SERPL-MCNC: 2.2 MG/DL (ref 1.6–2.4)
MCH RBC QN AUTO: 27.9 PG (ref 26–33)
MCHC RBC AUTO-ENTMCNC: 32.5 GM/DL (ref 32.2–35.5)
MCV RBC AUTO: 85.9 FL (ref 80–94)
MESOTHELIAL CELLS BODY FLUID: ABNORMAL
MONONUC CELLS NFR FLD AUTO: 47.9 %
NUC CELL # FLD AUTO: 2344 /CUMM (ref 0–500)
PATHOLOGIST REVIEW: ABNORMAL
PH BIFL: 7.57 [PH]
PLATELET # BLD AUTO: 337 THOU/MM3 (ref 130–400)
PMV BLD AUTO: 9.5 FL (ref 9.4–12.4)
POTASSIUM SERPL-SCNC: 4.9 MEQ/L (ref 3.5–5.2)
PROT FLD-MCNC: 3.1 GM/DL
RBC # BLD AUTO: 4.26 MILL/MM3 (ref 4.7–6.1)
RBC # FLD AUTO: 5000 /CUMM
SODIUM SERPL-SCNC: 143 MEQ/L (ref 135–145)
SPECIMEN: ABNORMAL
TOTAL VOLUME RECEIVED BODY FLUID: 73 ML
WBC # BLD AUTO: 12.7 THOU/MM3 (ref 4.8–10.8)

## 2024-03-15 PROCEDURE — 32555 ASPIRATE PLEURA W/ IMAGING: CPT

## 2024-03-15 PROCEDURE — 0W9B3ZZ DRAINAGE OF LEFT PLEURAL CAVITY, PERCUTANEOUS APPROACH: ICD-10-PCS | Performed by: RADIOLOGY

## 2024-03-15 PROCEDURE — 83615 LACTATE (LD) (LDH) ENZYME: CPT

## 2024-03-15 PROCEDURE — 6370000000 HC RX 637 (ALT 250 FOR IP)

## 2024-03-15 PROCEDURE — 83735 ASSAY OF MAGNESIUM: CPT

## 2024-03-15 PROCEDURE — 80048 BASIC METABOLIC PNL TOTAL CA: CPT

## 2024-03-15 PROCEDURE — 88112 CYTOPATH CELL ENHANCE TECH: CPT

## 2024-03-15 PROCEDURE — 86140 C-REACTIVE PROTEIN: CPT

## 2024-03-15 PROCEDURE — 87070 CULTURE OTHR SPECIMN AEROBIC: CPT

## 2024-03-15 PROCEDURE — 99232 SBSQ HOSP IP/OBS MODERATE 35: CPT | Performed by: INTERNAL MEDICINE

## 2024-03-15 PROCEDURE — 88305 TISSUE EXAM BY PATHOLOGIST: CPT

## 2024-03-15 PROCEDURE — 85027 COMPLETE CBC AUTOMATED: CPT

## 2024-03-15 PROCEDURE — 36415 COLL VENOUS BLD VENIPUNCTURE: CPT

## 2024-03-15 PROCEDURE — 84157 ASSAY OF PROTEIN OTHER: CPT

## 2024-03-15 PROCEDURE — 2580000003 HC RX 258

## 2024-03-15 PROCEDURE — 99232 SBSQ HOSP IP/OBS MODERATE 35: CPT | Performed by: NURSE PRACTITIONER

## 2024-03-15 PROCEDURE — 83986 ASSAY PH BODY FLUID NOS: CPT

## 2024-03-15 PROCEDURE — 87075 CULTR BACTERIA EXCEPT BLOOD: CPT

## 2024-03-15 PROCEDURE — 82945 GLUCOSE OTHER FLUID: CPT

## 2024-03-15 PROCEDURE — 2580000003 HC RX 258: Performed by: INTERNAL MEDICINE

## 2024-03-15 PROCEDURE — 6370000000 HC RX 637 (ALT 250 FOR IP): Performed by: NURSE PRACTITIONER

## 2024-03-15 PROCEDURE — 85651 RBC SED RATE NONAUTOMATED: CPT

## 2024-03-15 PROCEDURE — 82042 OTHER SOURCE ALBUMIN QUAN EA: CPT

## 2024-03-15 PROCEDURE — 89050 BODY FLUID CELL COUNT: CPT

## 2024-03-15 PROCEDURE — 71045 X-RAY EXAM CHEST 1 VIEW: CPT

## 2024-03-15 PROCEDURE — 88108 CYTOPATH CONCENTRATE TECH: CPT

## 2024-03-15 PROCEDURE — 2060000000 HC ICU INTERMEDIATE R&B

## 2024-03-15 RX ORDER — SODIUM CHLORIDE 9 MG/ML
INJECTION, SOLUTION INTRAVENOUS CONTINUOUS
Status: DISCONTINUED | OUTPATIENT
Start: 2024-03-15 | End: 2024-03-16 | Stop reason: HOSPADM

## 2024-03-15 RX ORDER — SODIUM CHLORIDE, SODIUM LACTATE, POTASSIUM CHLORIDE, AND CALCIUM CHLORIDE .6; .31; .03; .02 G/100ML; G/100ML; G/100ML; G/100ML
1000 INJECTION, SOLUTION INTRAVENOUS ONCE
Status: COMPLETED | OUTPATIENT
Start: 2024-03-15 | End: 2024-03-15

## 2024-03-15 RX ORDER — COLCHICINE 0.6 MG/1
0.6 TABLET ORAL 2 TIMES DAILY
Status: DISCONTINUED | OUTPATIENT
Start: 2024-03-15 | End: 2024-03-16

## 2024-03-15 RX ORDER — COLCHICINE 0.6 MG/1
0.6 CAPSULE ORAL 2 TIMES DAILY
Status: DISCONTINUED | OUTPATIENT
Start: 2024-03-15 | End: 2024-03-15

## 2024-03-15 RX ADMIN — SODIUM CHLORIDE, POTASSIUM CHLORIDE, SODIUM LACTATE AND CALCIUM CHLORIDE 1000 ML: 600; 310; 30; 20 INJECTION, SOLUTION INTRAVENOUS at 07:00

## 2024-03-15 RX ADMIN — PANTOPRAZOLE SODIUM 40 MG: 40 TABLET, DELAYED RELEASE ORAL at 06:53

## 2024-03-15 RX ADMIN — IBUPROFEN 800 MG: 800 TABLET, FILM COATED ORAL at 09:04

## 2024-03-15 RX ADMIN — TAMSULOSIN HYDROCHLORIDE 0.4 MG: 0.4 CAPSULE ORAL at 21:00

## 2024-03-15 RX ADMIN — SODIUM CHLORIDE: 9 INJECTION, SOLUTION INTRAVENOUS at 16:51

## 2024-03-15 RX ADMIN — COLCHICINE 0.6 MG: 0.6 TABLET, FILM COATED ORAL at 21:00

## 2024-03-15 ASSESSMENT — PAIN SCALES - GENERAL
PAINLEVEL_OUTOF10: 0

## 2024-03-15 NOTE — PROGRESS NOTES
Cardiology Progress Note      Patient:  Case Etienne  YOB: 1955  MRN: [id number]   Acct: [id number]  Admit Date:  3/13/2024  Primary Cardiologist: none  Seen by Dr. Linares     Per prior cardiology consult note-  CHIEF COMPLAINT:  Pericardial effusion        HPI: This is a pleasant 68 y.o. male presents with orthopnea and dyspnea with exertion.  Normally a very active person and works out frequently.  COVID infection in January with loss of taste at that time that has improved. Also has grandchildren that he is around a lot.  Approximately 2 weeks ago patient developed chest pain across his chest that was sharp and radiated to the neck and shoulders.  The plan to come to the emergency department send symptoms not resolving however he took a shower and the chest pain resolved.  Still seems to notice intermittent chest/shoulder/neck pain and pain in the epigastric area.  However his most significant complaint is shortness of breath with exertion.  Unable to lie flat recently.  Feels like her heart is being squeezed and pounding when laying on his left side.        Initial vital signs within normal limits.  EKG shows sinus rhythm with sinus arrhythmia.  BMP benign.  CRP elevated at 16.59.  Sed rate elevated at 79.  Trended troponin 17, 15.  CBC shows mild normocytic anemia.  CTA chest negative for pulmonary embolism, however there is small bilateral pleural effusions with adjacent atelectasis/infiltrate.  There is also a moderate-sized pericardial effusion measuring up to 1.9 cm in width.      Subjective (Events in last 24 hours):   Pt in bed   No chest pain or SOB     Did have thoracentesis today 100 ml off     Creat up 1.4- likely steroid induced - pt getting fluids    VSS  Tele SR no ectopy     Discussed with pt and wife causes of effusion and treatment / folowup    Objective:   BP (!) 118/55   Pulse 58   Temp 99.1 °F (37.3 °C) (Oral)   Resp 18   Wt 97.1 kg (214 lb 0.4 oz)   SpO2 91%   BMI 30.27    Component Value Date/Time    ALKPHOS 99 03/13/2024 02:00 PM    ALT 35 03/13/2024 02:00 PM    AST 25 03/13/2024 02:00 PM    PROT 7.3 03/14/2024 03:41 PM    BILITOT 0.6 03/13/2024 02:00 PM    BILIDIR <0.2 03/13/2024 02:00 PM    LABALBU 3.5 03/14/2024 03:41 PM       Magnesium:    Lab Results   Component Value Date/Time    MG 2.2 03/15/2024 05:13 AM       PT/INR:    Lab Results   Component Value Date/Time    INR 1.46 03/14/2024 03:41 PM       HgBA1c:  No results found for: \"LABA1C\"    FLP:    Lab Results   Component Value Date/Time    TRIG 168 02/26/2024 09:02 AM    HDL 37 02/26/2024 09:02 AM    LDLCALC 88 02/26/2024 09:02 AM       TSH:    Lab Results   Component Value Date/Time    TSH 1.810 03/14/2024 05:15 AM         Assessment:    SOB - multifactorial   -- resolved     Pericarditis    Continue colchicine and ibuprophen  - no steroids please      Moderate pericardial effusion - no tamponade physiology    Small pleural effusions noted on CT  - sp thoracentesis       Covid 19 infection Jan 2024        Plan:  Ok to GUIDO from cardiology   Continue colchicine for 3 months then stop   Continue ibuprofen for 6 weeks   Repeat echo 6 week then 8 week followup with Cards       Electronically signed by RAYMUNDO Holcomb CNP on 3/15/2024 at 12:14 PM

## 2024-03-15 NOTE — PLAN OF CARE
Problem: Discharge Planning  Goal: Discharge to home or other facility with appropriate resources  3/15/2024 0102 by Belen Luo RN  Outcome: Progressing  3/14/2024 1758 by Oli Villar RN  Outcome: Progressing  Flowsheets (Taken 3/14/2024 1758)  Discharge to home or other facility with appropriate resources:   Identify barriers to discharge with patient and caregiver   Arrange for needed discharge resources and transportation as appropriate   Identify discharge learning needs (meds, wound care, etc)   Refer to discharge planning if patient needs post-hospital services based on physician order or complex needs related to functional status, cognitive ability or social support system     Problem: Pain  Goal: Verbalizes/displays adequate comfort level or baseline comfort level  3/15/2024 0102 by Belen Luo RN  Outcome: Progressing  3/14/2024 1758 by Oli Villar RN  Outcome: Progressing  Flowsheets (Taken 3/14/2024 1758)  Verbalizes/displays adequate comfort level or baseline comfort level:   Encourage patient to monitor pain and request assistance   Assess pain using appropriate pain scale   Administer analgesics based on type and severity of pain and evaluate response   Implement non-pharmacological measures as appropriate and evaluate response   Consider cultural and social influences on pain and pain management   Notify Licensed Independent Practitioner if interventions unsuccessful or patient reports new pain     Problem: ABCDS Injury Assessment  Goal: Absence of physical injury  3/15/2024 0102 by Belen Luo RN  Outcome: Progressing  3/14/2024 1758 by Oli Villar, RN  Outcome: Progressing  Flowsheets (Taken 3/14/2024 1758)  Absence of Physical Injury: Implement safety measures based on patient assessment     Problem: Cardiovascular - Adult  Goal: Maintains optimal cardiac output and hemodynamic stability  3/15/2024 0102 by Belen Luo RN  Outcome: Progressing  3/14/2024  Notify Licensed Independent Practitioner if interventions unsuccessful or patient reports new pain     Problem: ABCDS Injury Assessment  Goal: Absence of physical injury  3/15/2024 0102 by Belen Luo RN  Outcome: Progressing  3/14/2024 1758 by Oli Villar RN  Outcome: Progressing  Flowsheets (Taken 3/14/2024 1758)  Absence of Physical Injury: Implement safety measures based on patient assessment     Problem: Cardiovascular - Adult  Goal: Maintains optimal cardiac output and hemodynamic stability  3/15/2024 0102 by Belen Luo RN  Outcome: Progressing  3/14/2024 1758 by Oli Villar RN  Outcome: Progressing  Flowsheets (Taken 3/14/2024 1758)  Maintains optimal cardiac output and hemodynamic stability:   Monitor blood pressure and heart rate   Monitor urine output and notify Licensed Independent Practitioner for values outside of normal range  Goal: Absence of cardiac dysrhythmias or at baseline  3/15/2024 0102 by Belen Luo RN  Outcome: Progressing  3/14/2024 1758 by Oli Villar RN  Outcome: Progressing  Flowsheets (Taken 3/14/2024 1758)  Absence of cardiac dysrhythmias or at baseline: Monitor cardiac rate and rhythm

## 2024-03-15 NOTE — PROGRESS NOTES
Internal Medicine Resident Progress Note    Name: Case Etienne, male, : 1955, MRN: [id number]    PCP: Gabino Kelley MD    Date of Admission: 3/13/2024  Date of Service: Pt seen/examined on 03/15/24      Assessment/Plan:  Acute pericarditis with pericardial effusion-likely secondary to recent viral illness: Patient reports sharp chest pain is worse on the left side and  relieved by laying on the left side.  Pain began about 1 to 2 weeks ago.  It was sudden onset after moving heavy object.  Patient also developed shortness of breath and feelings of anxiety at night when laying flat over the past 4 to 5 days.  He also reports feeling fatigued.  CTA of the chest showed 1 to 2 cm pericardial effusion with bilateral pleural effusions.  Echocardiogram showed a moderate pericardial effusion , again 1 to 2 cm, along with no indication of cardiac tamponade.  EKG showed normal sinus rhythm with nonspecific T wave changes from previous.  ESR elevated at 79 with CRP 16.59.  Patient has been around sick people with the flu for the past several weeks prior to the onset of symptoms.  EKG was negative for ischemic changes with tropes 15 and 17.  CTA negative for PE.  BNP elevated likely secondary to pericardial effusion.  Influenza A, influenza B, and COVID test negative on arrival.  No previous history of heart failure.  Repeat ESR and CRP pending.  If improved will continue with colchicine.  If no improvement then will start glucocorticosteroid  Cardiology is following  Colchicine started 3/14  ibuprofen 800 mg daily currently held due to BESS    BESS-likely secondary to nephrotoxic medications: Creatinine 1.4 today.  Baseline appears to be less than 1.2.  Patient started on colchicine and high-dose ibuprofen yesterday.  Ibuprofen held.  Colchicine currently on hold may need to be resumed for treatment of acute pericarditis.  Patient given a fluid bolus of 1 L with lactated Ringer's.  Repeat BMP in the

## 2024-03-15 NOTE — DISCHARGE INSTRUCTIONS
Continue colchicine for 3 months then stop   Repeat echo 6 week then 8 week followup with Cards  Resume normal activities

## 2024-03-16 VITALS
HEART RATE: 60 BPM | OXYGEN SATURATION: 95 % | DIASTOLIC BLOOD PRESSURE: 63 MMHG | RESPIRATION RATE: 18 BRPM | TEMPERATURE: 97.9 F | BODY MASS INDEX: 31.12 KG/M2 | WEIGHT: 220 LBS | SYSTOLIC BLOOD PRESSURE: 118 MMHG

## 2024-03-16 LAB
ANION GAP SERPL CALC-SCNC: 13 MEQ/L (ref 8–16)
BUN SERPL-MCNC: 19 MG/DL (ref 7–22)
CALCIUM SERPL-MCNC: 8.8 MG/DL (ref 8.5–10.5)
CHLORIDE SERPL-SCNC: 107 MEQ/L (ref 98–111)
CO2 SERPL-SCNC: 21 MEQ/L (ref 23–33)
CREAT SERPL-MCNC: 1.2 MG/DL (ref 0.4–1.2)
CRP SERPL-MCNC: 23.56 MG/DL (ref 0–1)
DEPRECATED RDW RBC AUTO: 41.5 FL (ref 35–45)
ERYTHROCYTE [DISTWIDTH] IN BLOOD BY AUTOMATED COUNT: 12.9 % (ref 11.5–14.5)
ERYTHROCYTE [SEDIMENTATION RATE] IN BLOOD BY WESTERGREN METHOD: 67 MM/HR (ref 0–10)
GFR SERPL CREATININE-BSD FRML MDRD: > 60 ML/MIN/1.73M2
GLUCOSE SERPL-MCNC: 100 MG/DL (ref 70–108)
HCT VFR BLD AUTO: 35 % (ref 42–52)
HGB BLD-MCNC: 11.1 GM/DL (ref 14–18)
MAGNESIUM SERPL-MCNC: 2.1 MG/DL (ref 1.6–2.4)
MCH RBC QN AUTO: 28 PG (ref 26–33)
MCHC RBC AUTO-ENTMCNC: 31.7 GM/DL (ref 32.2–35.5)
MCV RBC AUTO: 88.2 FL (ref 80–94)
PLATELET # BLD AUTO: 354 THOU/MM3 (ref 130–400)
PMV BLD AUTO: 9.8 FL (ref 9.4–12.4)
POTASSIUM SERPL-SCNC: 4.6 MEQ/L (ref 3.5–5.2)
RBC # BLD AUTO: 3.97 MILL/MM3 (ref 4.7–6.1)
SODIUM SERPL-SCNC: 141 MEQ/L (ref 135–145)
WBC # BLD AUTO: 9.9 THOU/MM3 (ref 4.8–10.8)

## 2024-03-16 PROCEDURE — 80048 BASIC METABOLIC PNL TOTAL CA: CPT

## 2024-03-16 PROCEDURE — 6370000000 HC RX 637 (ALT 250 FOR IP)

## 2024-03-16 PROCEDURE — 6370000000 HC RX 637 (ALT 250 FOR IP): Performed by: NURSE PRACTITIONER

## 2024-03-16 PROCEDURE — 86140 C-REACTIVE PROTEIN: CPT

## 2024-03-16 PROCEDURE — 85027 COMPLETE CBC AUTOMATED: CPT

## 2024-03-16 PROCEDURE — 85651 RBC SED RATE NONAUTOMATED: CPT

## 2024-03-16 PROCEDURE — 83735 ASSAY OF MAGNESIUM: CPT

## 2024-03-16 PROCEDURE — 2580000003 HC RX 258

## 2024-03-16 PROCEDURE — 36415 COLL VENOUS BLD VENIPUNCTURE: CPT

## 2024-03-16 RX ORDER — COLCHICINE 0.6 MG/1
0.6 TABLET ORAL 2 TIMES DAILY
Status: DISCONTINUED | OUTPATIENT
Start: 2024-03-16 | End: 2024-03-16 | Stop reason: HOSPADM

## 2024-03-16 RX ORDER — COLCHICINE 0.6 MG/1
0.6 TABLET ORAL 2 TIMES DAILY
Qty: 60 TABLET | Refills: 2 | Status: SHIPPED | OUTPATIENT
Start: 2024-03-16 | End: 2024-06-14

## 2024-03-16 RX ORDER — PANTOPRAZOLE SODIUM 40 MG/1
40 TABLET, DELAYED RELEASE ORAL
Qty: 30 TABLET | Refills: 3 | Status: SHIPPED | OUTPATIENT
Start: 2024-03-17

## 2024-03-16 RX ORDER — POLYETHYLENE GLYCOL 3350 17 G/17G
17 POWDER, FOR SOLUTION ORAL DAILY PRN
Qty: 30 PACKET | Refills: 0 | Status: SHIPPED | OUTPATIENT
Start: 2024-03-16 | End: 2024-04-15

## 2024-03-16 RX ADMIN — SODIUM CHLORIDE: 9 INJECTION, SOLUTION INTRAVENOUS at 05:35

## 2024-03-16 RX ADMIN — PANTOPRAZOLE SODIUM 40 MG: 40 TABLET, DELAYED RELEASE ORAL at 04:48

## 2024-03-16 RX ADMIN — COLCHICINE 0.6 MG: 0.6 TABLET, FILM COATED ORAL at 10:07

## 2024-03-16 ASSESSMENT — PAIN SCALES - GENERAL: PAINLEVEL_OUTOF10: 0

## 2024-03-16 NOTE — PLAN OF CARE
Problem: Discharge Planning  Goal: Discharge to home or other facility with appropriate resources  Outcome: Progressing  Flowsheets (Taken 3/15/2024 2045)  Discharge to home or other facility with appropriate resources:   Identify barriers to discharge with patient and caregiver   Arrange for needed discharge resources and transportation as appropriate   Identify discharge learning needs (meds, wound care, etc)     Problem: Pain  Goal: Verbalizes/displays adequate comfort level or baseline comfort level  Outcome: Progressing  Flowsheets (Taken 3/15/2024 2330)  Verbalizes/displays adequate comfort level or baseline comfort level:   Encourage patient to monitor pain and request assistance   Assess pain using appropriate pain scale   Administer analgesics based on type and severity of pain and evaluate response   Implement non-pharmacological measures as appropriate and evaluate response     Problem: ABCDS Injury Assessment  Goal: Absence of physical injury  Outcome: Progressing  Flowsheets (Taken 3/14/2024 1758 by Oli Villar, RN)  Absence of Physical Injury: Implement safety measures based on patient assessment     Problem: Cardiovascular - Adult  Goal: Maintains optimal cardiac output and hemodynamic stability  Outcome: Progressing  Flowsheets (Taken 3/15/2024 2045)  Maintains optimal cardiac output and hemodynamic stability:   Monitor blood pressure and heart rate   Monitor urine output and notify Licensed Independent Practitioner for values outside of normal range   Assess for signs of decreased cardiac output     Problem: Cardiovascular - Adult  Goal: Absence of cardiac dysrhythmias or at baseline  Outcome: Progressing  Flowsheets (Taken 3/15/2024 2045)  Absence of cardiac dysrhythmias or at baseline: Monitor cardiac rate and rhythm   Care plan reviewed with patient.  Patient  verbalize understanding of the plan of care and contribute to goal setting.      01-Aug-2018 22:18

## 2024-03-17 LAB
BACTERIA SPEC ANAEROBE CULT: NORMAL
BACTERIA SPEC BFLD CULT: NORMAL
GRAM STN SPEC: NORMAL

## 2024-03-17 NOTE — DISCHARGE SUMMARY
Resident Discharge Summary (Hospitalist)      Patient Identification:   Case Etienne   : 1955  MRN: [id number]   Account: [id number]   Patient's PCP: Gabino Kelley MD    Admit Date: 3/13/2024   Discharge Date: 3/16/2024      Admitting Physician: No admitting provider for patient encounter.  Discharge Physician: Jared Cuevas MD       Discharge Diagnoses:  Acute Pericarditis  - Presenting symptom: chest pain radiating across neck and shoulders with shortness of breath with exertion for two weeks  - 2024 CTA Chest W WO Contrast showing moderate sized pericardial effusion 1.9 cm in width  - 2024 ECHO EF 50-55% with no wall motion abnormalities; Moderate 1-2 cm pericardial effusion with no indication of tamponade   - 2024 CRP 16.59, ESR 79 on admission  - Cardiology consulted: Dx Acute Viral pericarditis with pericardial effusion; recommendation for NSAIDS and Colchicine with avoidance of steroids  - Patient loaded with colchicine and ibuprofen; complicated by development of BESS; patient transitioned to colchicine maintenance monotherapy at discharge  - Repeat TTE @ 4 weeks with Dr. Linares    Bilateral Pleural Effusions  - Secondary to cardiac dysfunction  - 2024 CTA Chest showing bilateral pleural effusions  - s/p 2024 Thoracentesis  - 2024 Total Protein 8.9, , Albumin 3.5; Body Fluid Protein 3.1, , Albumin 2.0  - Body fluid culture showing no growth; cell count no malignant cells; cytology pending; suspicious for pseudo-exudative effusion    BPH- on tamsulosin    Hospital Course:   This is a 68-year-old male who presented with a 2-week history of sharp diffuse chest pain with radiation into his neck as well as persistent shortness of breath with exertion.  He presented to the emergency department where a CTA of the chest with and without contrast showed moderate-sized pericardial effusion.  The patient was admitted and started on NSAIDs.  No evidence of

## 2024-03-20 LAB
BACTERIA SPEC ANAEROBE CULT: NORMAL
BACTERIA SPEC BFLD CULT: NORMAL
GRAM STN SPEC: NORMAL

## 2024-04-09 ENCOUNTER — HOSPITAL ENCOUNTER (OUTPATIENT)
Age: 69
Discharge: HOME | End: 2024-04-09
Payer: MEDICARE

## 2024-04-09 ENCOUNTER — HOSPITAL ENCOUNTER (OUTPATIENT)
Dept: GENERAL RADIOLOGY | Age: 69
Discharge: HOME | End: 2024-04-09
Payer: MEDICARE

## 2024-04-09 DIAGNOSIS — J90 PLEURAL EFFUSION: ICD-10-CM

## 2024-04-09 PROCEDURE — 71046 X-RAY EXAM CHEST 2 VIEWS: CPT

## 2024-04-30 ENCOUNTER — HOSPITAL ENCOUNTER (OUTPATIENT)
Age: 69
Discharge: HOME | End: 2024-05-02
Payer: MEDICARE

## 2024-04-30 VITALS
HEIGHT: 70 IN | DIASTOLIC BLOOD PRESSURE: 70 MMHG | SYSTOLIC BLOOD PRESSURE: 118 MMHG | BODY MASS INDEX: 31.5 KG/M2 | WEIGHT: 220 LBS

## 2024-04-30 DIAGNOSIS — I30.1 ACUTE VIRAL PERICARDITIS: ICD-10-CM

## 2024-04-30 LAB — ECHO BSA: 2.22 M2

## 2024-04-30 PROCEDURE — 93321 DOPPLER ECHO F-UP/LMTD STD: CPT

## 2024-04-30 PROCEDURE — 93308 TTE F-UP OR LMTD: CPT | Performed by: INTERNAL MEDICINE

## 2024-04-30 PROCEDURE — 93321 DOPPLER ECHO F-UP/LMTD STD: CPT | Performed by: INTERNAL MEDICINE

## 2024-05-14 ENCOUNTER — OFFICE VISIT (OUTPATIENT)
Dept: CARDIOLOGY CLINIC | Age: 69
End: 2024-05-14
Payer: MEDICARE

## 2024-05-14 VITALS
WEIGHT: 208.2 LBS | SYSTOLIC BLOOD PRESSURE: 112 MMHG | HEART RATE: 58 BPM | HEIGHT: 70 IN | DIASTOLIC BLOOD PRESSURE: 62 MMHG | BODY MASS INDEX: 29.81 KG/M2

## 2024-05-14 DIAGNOSIS — I30.1 ACUTE VIRAL PERICARDITIS: Primary | ICD-10-CM

## 2024-05-14 PROCEDURE — 99213 OFFICE O/P EST LOW 20 MIN: CPT | Performed by: INTERNAL MEDICINE

## 2024-05-14 PROCEDURE — G8417 CALC BMI ABV UP PARAM F/U: HCPCS | Performed by: INTERNAL MEDICINE

## 2024-05-14 PROCEDURE — 3017F COLORECTAL CA SCREEN DOC REV: CPT | Performed by: INTERNAL MEDICINE

## 2024-05-14 PROCEDURE — 1123F ACP DISCUSS/DSCN MKR DOCD: CPT | Performed by: INTERNAL MEDICINE

## 2024-05-14 PROCEDURE — G8427 DOCREV CUR MEDS BY ELIG CLIN: HCPCS | Performed by: INTERNAL MEDICINE

## 2024-05-14 PROCEDURE — 1036F TOBACCO NON-USER: CPT | Performed by: INTERNAL MEDICINE

## 2024-05-14 NOTE — PATIENT INSTRUCTIONS
Your nurses today were GLENDA Timmons and GURJIT Hernandez  Your provider today was Dr. Linares  Phone number: [phone]       You may receive a survey regarding the care you received during your visit.  Your input is valuable to us.  We encourage you to complete and return your survey.  We hope you will choose us in the future for your healthcare needs.

## 2024-05-14 NOTE — PROGRESS NOTES
NP, follow-up on echo.   Was on colchicine and echo done to follow-up on pericarditis.   Doesn't have the stamina as before but feels like he is getting stronger.      No

## 2024-05-14 NOTE — PROGRESS NOTES
[facility] [place] [facility]  [facility]OLOG[facility]  [address]  [address]  [facility] [address]  Dept: [phone]  Dept Fax: [phone]  Loc: [phone]    Visit Date: 5/14/2024    Mr. Etienne is a 68 y.o. male  who presented for:  Chief Complaint   Patient presents with    New Patient    Results     Echo       HPI:   HPI   67 yo M who presents with acute viral pericarditis previously follows-up.  He was treated with colchicine and ibuprofen.  Had some renal issues.  No recurrence.  No hemodynamic issues.  TTE shows no effusion, normal EF.  No f/c/ns.  No chest pain, angina, BRYANT, orthopnea, PND, sob at rest, palpitations, LE edema, or syncope.    He feels as if he is getting back to baseline.          Current Outpatient Medications:     tamsulosin (FLOMAX) 0.4 MG capsule, Take 1 capsule by mouth daily, Disp: 90 capsule, Rfl: 3    Past Medical History  Case  has a past medical history of BPH (benign prostatic hyperplasia) and SVT (supraventricular tachycardia).    Social History  Case  reports that he has never smoked. He has never used smokeless tobacco. He reports that he does not use drugs.    Family History  Case family history is not on file.    There is no family history of bicuspid aortic valve, aneurysms, heart transplant, pacemakers, defibrillators, or sudden cardiac death.      Past Surgical History   Past Surgical History:   Procedure Laterality Date    WISDOM TOOTH EXTRACTION         Review of Systems   Constitutional: Negative for chills and fever  HENT: Negative for congestion, sinus pressure, sneezing and sore throat.    Eyes: Negative for pain, discharge, redness and itching.   Respiratory: Negative for apnea, cough  Gastrointestinal: Negative for blood in stool, constipation, diarrhea   Endocrine: Negative for cold intolerance, heat intolerance, polydipsia.  Genitourinary: Negative for dysuria, enuresis, flank pain and hematuria.   Musculoskeletal: Negative for

## 2024-12-16 NOTE — PROGRESS NOTES
Meningococcal (ACWY) vaccine  Aged Out    GFR test (Diabetes, CKD 3-4, OR last GFR 15-59)  Discontinued    Pneumococcal 0-64 years Vaccine  Discontinued    Prostate Specific Antigen (PSA) Screening or Monitoring  Discontinued        Objective:  General:   Well developed, well nourished  Lungs:   Clear to auscultation  Heart:    Normal rhythm, renny rate Normal S1 S2, no murmur. no rubs, no gallops  Abdomen:   Soft, non tender, no organomegalies, positive bowel sounds  Extremities:   No edema, no cyanosis, good peripheral pulses  Neurological:   Awake, alert, oriented. No obvious focal deficits  Musculoskelatal:  No obvious deformities   /80   Pulse 50   Ht 1.778 m (5' 10\")   Wt 98.9 kg (218 lb)   BMI 31.28 kg/m²     Assessment:  Assessment & Plan    Diagnosis Orders   1. Palpitations            Plan:  Palpitations/increased heart rate--no new concerns symptom wise. HR immediately returns to baseline upon rest. No other concerns. Offered event monitor for a few days. He wants to wait and see if anything else develops. No concerning exam findings. Regular rhythm, mldly renny rate which is his baseline. No rubs or murmurs.   Orders Placed:  No orders of the defined types were placed in this encounter.      Disposition:  PRN  F/u with DR Linares  if needed.    Discussed use, benefit, and side effects of prescribed medications. All patient questions answered. Pt voiced understanding. Instructed to continue current medications, diet and exercise. Continue risk factor modification and medical management. Patient agreed with treatment plan. Follow up as directed.    Electronically signedby Connor Mortimer, PA-C on 12/18/2024 at 8:22 AM

## 2024-12-18 ENCOUNTER — OFFICE VISIT (OUTPATIENT)
Dept: CARDIOLOGY CLINIC | Age: 69
End: 2024-12-18
Payer: MEDICARE

## 2024-12-18 VITALS
HEART RATE: 50 BPM | SYSTOLIC BLOOD PRESSURE: 137 MMHG | HEIGHT: 70 IN | BODY MASS INDEX: 31.21 KG/M2 | WEIGHT: 218 LBS | DIASTOLIC BLOOD PRESSURE: 80 MMHG

## 2024-12-18 DIAGNOSIS — R00.2 PALPITATIONS: Primary | ICD-10-CM

## 2024-12-18 PROCEDURE — 1159F MED LIST DOCD IN RCRD: CPT | Performed by: STUDENT IN AN ORGANIZED HEALTH CARE EDUCATION/TRAINING PROGRAM

## 2024-12-18 PROCEDURE — G8417 CALC BMI ABV UP PARAM F/U: HCPCS | Performed by: STUDENT IN AN ORGANIZED HEALTH CARE EDUCATION/TRAINING PROGRAM

## 2024-12-18 PROCEDURE — 99214 OFFICE O/P EST MOD 30 MIN: CPT | Performed by: STUDENT IN AN ORGANIZED HEALTH CARE EDUCATION/TRAINING PROGRAM

## 2024-12-18 PROCEDURE — G8427 DOCREV CUR MEDS BY ELIG CLIN: HCPCS | Performed by: STUDENT IN AN ORGANIZED HEALTH CARE EDUCATION/TRAINING PROGRAM

## 2024-12-18 PROCEDURE — 3017F COLORECTAL CA SCREEN DOC REV: CPT | Performed by: STUDENT IN AN ORGANIZED HEALTH CARE EDUCATION/TRAINING PROGRAM

## 2024-12-18 PROCEDURE — 1123F ACP DISCUSS/DSCN MKR DOCD: CPT | Performed by: STUDENT IN AN ORGANIZED HEALTH CARE EDUCATION/TRAINING PROGRAM

## 2024-12-18 PROCEDURE — G8484 FLU IMMUNIZE NO ADMIN: HCPCS | Performed by: STUDENT IN AN ORGANIZED HEALTH CARE EDUCATION/TRAINING PROGRAM

## 2024-12-18 PROCEDURE — 1036F TOBACCO NON-USER: CPT | Performed by: STUDENT IN AN ORGANIZED HEALTH CARE EDUCATION/TRAINING PROGRAM

## 2025-04-10 ENCOUNTER — OFFICE VISIT (OUTPATIENT)
Dept: UROLOGY | Age: 70
End: 2025-04-10
Payer: MEDICARE

## 2025-04-10 ENCOUNTER — TELEPHONE (OUTPATIENT)
Dept: UROLOGY | Age: 70
End: 2025-04-10

## 2025-04-10 VITALS — BODY MASS INDEX: 31.7 KG/M2 | HEIGHT: 69 IN | WEIGHT: 214 LBS | RESPIRATION RATE: 20 BRPM

## 2025-04-10 DIAGNOSIS — N40.1 BENIGN LOCALIZED PROSTATIC HYPERPLASIA WITH LOWER URINARY TRACT SYMPTOMS (LUTS): Primary | ICD-10-CM

## 2025-04-10 DIAGNOSIS — R97.20 ELEVATED PSA: ICD-10-CM

## 2025-04-10 PROCEDURE — 1159F MED LIST DOCD IN RCRD: CPT | Performed by: UROLOGY

## 2025-04-10 PROCEDURE — 99204 OFFICE O/P NEW MOD 45 MIN: CPT | Performed by: UROLOGY

## 2025-04-10 PROCEDURE — 1036F TOBACCO NON-USER: CPT | Performed by: UROLOGY

## 2025-04-10 PROCEDURE — 1123F ACP DISCUSS/DSCN MKR DOCD: CPT | Performed by: UROLOGY

## 2025-04-10 PROCEDURE — G8427 DOCREV CUR MEDS BY ELIG CLIN: HCPCS | Performed by: UROLOGY

## 2025-04-10 PROCEDURE — G8417 CALC BMI ABV UP PARAM F/U: HCPCS | Performed by: UROLOGY

## 2025-04-10 PROCEDURE — 3017F COLORECTAL CA SCREEN DOC REV: CPT | Performed by: UROLOGY

## 2025-04-10 NOTE — TELEPHONE ENCOUNTER
Patient scheduled for MRI PROSTATE W WO  at [facility] on 41/25/20258.  Arrival of 815 for a 845 scan time.  Order  with instructions given to the patient in the office

## 2025-04-10 NOTE — PROGRESS NOTES
Rayray Ramos MD.    [place] [facility]  [facility]  [address]  [address]  [facility] [address]  Dept: [phone]  Dept Fax: [phone]  Loc: [phone]    [facility] Urology Office Note -     Patient:  Case Etienne  YOB: 1955    The patient is a 69 y.o. male who presents today for evaluation of the following problems:   Chief Complaint   Patient presents with    New Patient     Benign prostatic hyperplasia, wanted to see what option he had to make it better. If pt drinks any alcohol  its a pain full and then he is unable to urinate just alcohol is automatic  night also and any high sugar his urine will then burn    Was here years ago but can't rember who.    referred/consultation requested by Gabino Kelley MD.    History of Present Illness:    BPH  On flomax  Auass 22 bothered  Dysuria   Psa 3.3      Summary of Previous Records:  63 year old male with psa = 1.92 ng/ml in . It's currently 2.65 ng/ml. He denies any new voiding symptoms. Father had PCa, diagnosed at age 77.  at 79 from CVA.       Requested/reviewed records from Gabino Kelley MD office and/or outside physician/EMR    (Patient's old records have been requested, reviewed and pertinent findings summarized in today's note.)    Procedures Today: N/A      Last several PSA's:  No results found for: \"PSA\"    Last total testosterone:  No results found for: \"TESTOSTERONE\"    Urinalysis today:  No results found for this visit on 04/10/25.    Last BUN and creatinine:  Lab Results   Component Value Date    BUN 19 2024     Lab Results   Component Value Date    CREATININE 1.2 2024         Imaging Reviewed during this Office Visit:   RAYRAY RAMOS MD independently reviewed the images and verified the radiology reports from:        PAST MEDICAL, FAMILY AND SOCIAL HISTORY:  Past Medical History:   Diagnosis Date    BPH (benign prostatic hyperplasia)     SVT (supraventricular

## 2025-04-25 ENCOUNTER — HOSPITAL ENCOUNTER (OUTPATIENT)
Dept: MRI IMAGING | Age: 70
Discharge: HOME | End: 2025-04-25
Attending: UROLOGY
Payer: MEDICARE

## 2025-04-25 DIAGNOSIS — N40.1 BENIGN LOCALIZED PROSTATIC HYPERPLASIA WITH LOWER URINARY TRACT SYMPTOMS (LUTS): ICD-10-CM

## 2025-04-25 LAB
CREAT BLD-MCNC: 1 MG/DL (ref 0.5–1.2)
GFR SERPL CREATININE-BSD FRML MDRD: 82 ML/MIN/1.73M2

## 2025-04-25 PROCEDURE — A9579 GAD-BASE MR CONTRAST NOS,1ML: HCPCS | Performed by: UROLOGY

## 2025-04-25 PROCEDURE — 6360000004 HC RX CONTRAST MEDICATION: Performed by: UROLOGY

## 2025-04-25 PROCEDURE — 82565 ASSAY OF CREATININE: CPT

## 2025-04-25 PROCEDURE — 72197 MRI PELVIS W/O & W/DYE: CPT

## 2025-04-25 RX ADMIN — GADOTERIDOL 20 ML: 279.3 INJECTION, SOLUTION INTRAVENOUS at 09:04

## 2025-04-29 ENCOUNTER — HOSPITAL ENCOUNTER (OUTPATIENT)
Dept: UROLOGY | Age: 70
Discharge: HOME | End: 2025-04-29
Payer: MEDICARE

## 2025-04-29 VITALS
OXYGEN SATURATION: 95 % | DIASTOLIC BLOOD PRESSURE: 73 MMHG | TEMPERATURE: 97.2 F | HEART RATE: 53 BPM | WEIGHT: 212.8 LBS | SYSTOLIC BLOOD PRESSURE: 116 MMHG | RESPIRATION RATE: 16 BRPM | HEIGHT: 70 IN | BODY MASS INDEX: 30.46 KG/M2

## 2025-04-29 LAB
BILIRUBIN, URINE: NEGATIVE
BLOOD URINE, POC: NEGATIVE
CHARACTER, URINE: CLEAR
COLOR, UA: YELLOW
GLUCOSE URINE: NEGATIVE MG/DL
KETONES, URINE: NEGATIVE
LEUKOCYTE CLUMPS, URINE: NEGATIVE
NITRITE, URINE: NEGATIVE
PH, URINE: 5.5 (ref 5–9)
PROTEIN, URINE: ABNORMAL MG/DL
SPECIFIC GRAVITY UA: >= 1.03 (ref 1–1.03)
UROBILINOGEN, URINE: 0.2 EU/DL (ref 0–1)

## 2025-04-29 PROCEDURE — 52000 CYSTOURETHROSCOPY: CPT

## 2025-04-29 NOTE — PROGRESS NOTES
[facility]  This procedure has been fully reviewed with the patient and written informed consent has been obtained.  0954 Procedure started with Dr. Ramos  0955 Procedure completed; patient tolerated well.  Dr. Ramos talked to patient in length about procedure findings.  Patient discharged.    PLAN     Double Flomax per Dr. Ramos.    Follow up with Dr. Ramos in office in 3 months.  Office will call to schedule.

## 2025-04-29 NOTE — DISCHARGE INSTRUCTIONS
Discharge instructions: Cystoscopy  You May experience painful urination and see blood in the urine after your procedure.  This should resolve over time.      Pt ok to discharge home in good condition  No heavy lifting, >10 lbs for today  Pt should avoid strenuous activity for today  Pt should walk moderately at home  Pt ok to shower   Pt may resume diet as tolerated  Please call attending physician or hospital  with questions  Call or Present to ED if fever (> 101F), intractable nausea vomiting or pain.    Double Flomax per Dr. Ramos.    Follow up with Dr. Ramos in office in 3 months.  Office will call to schedule.

## 2025-04-29 NOTE — OP NOTE
Cystoscopy    Operative Note    Patient:  Case Etienne  MRN: [id number]  YOB: 1955    Date: 04/29/25  Surgeon: YENI ZAFAR MD  Anesthesia: Urojet Local  Indications:     BPH- on flomax. Worsening. Auass 22. Can double flomax.     Elevated psa- psa was > 4 in past. Psa 3.3 now. Father with pca but he passed away. We discussed pMRI.     Pmri and cysto for mcneill eval     imaging independently reviewed by YENI ZAFAR MD and radiology report verified demonstrating     Pmri    IMPRESSION:  1. Moderate prostatomegaly.  2. Overall PI-RADS assessment is PI-RADS 2.                 Position: Supine  EBL: 0 ml    Findings:   The patient was prepped and draped in the usual sterile fashion.  The flexible cystoscope was advanced through the urethra and into the bladder.  The bladder was thoroughly inspected and the following was noted:    Residual Urine: moderate.  Urine clear, with no obvious infection  Urethra: No abnormalities of the urethra are noted. Urethral dilation was NOT performed.    Prostate: lateral lobe hypertrophy ++ present, prostate obstructing, intravesical extension of prostate WAS present. There was NO previous TURP defect.   Bladder: no tumor noted .   Moderate trabeculation noted.  NO bladder diverticulum.  Ureters: Orifices with normal configuration and location.      The cystoscope was removed.  The patient tolerated the procedure well.  60 g prostate  Pirads 2 mri  Pt did not double flomax. Will do this  Will check up in three months with pvr. We discussed surg management     F/u 3 months with pvr catrachita zafar

## 2025-07-29 ENCOUNTER — OFFICE VISIT (OUTPATIENT)
Dept: UROLOGY | Age: 70
End: 2025-07-29
Payer: MEDICARE

## 2025-07-29 VITALS
SYSTOLIC BLOOD PRESSURE: 114 MMHG | BODY MASS INDEX: 30.18 KG/M2 | WEIGHT: 210.8 LBS | DIASTOLIC BLOOD PRESSURE: 70 MMHG | HEIGHT: 70 IN

## 2025-07-29 DIAGNOSIS — R97.20 ELEVATED PSA: ICD-10-CM

## 2025-07-29 DIAGNOSIS — N40.1 BENIGN LOCALIZED PROSTATIC HYPERPLASIA WITH LOWER URINARY TRACT SYMPTOMS (LUTS): Primary | ICD-10-CM

## 2025-07-29 PROCEDURE — G8417 CALC BMI ABV UP PARAM F/U: HCPCS | Performed by: UROLOGY

## 2025-07-29 PROCEDURE — 99214 OFFICE O/P EST MOD 30 MIN: CPT | Performed by: UROLOGY

## 2025-07-29 PROCEDURE — 1123F ACP DISCUSS/DSCN MKR DOCD: CPT | Performed by: UROLOGY

## 2025-07-29 PROCEDURE — 1036F TOBACCO NON-USER: CPT | Performed by: UROLOGY

## 2025-07-29 PROCEDURE — G8427 DOCREV CUR MEDS BY ELIG CLIN: HCPCS | Performed by: UROLOGY

## 2025-07-29 PROCEDURE — 3017F COLORECTAL CA SCREEN DOC REV: CPT | Performed by: UROLOGY

## 2025-07-29 PROCEDURE — 1159F MED LIST DOCD IN RCRD: CPT | Performed by: UROLOGY

## 2025-07-29 NOTE — PROGRESS NOTES
WISDOM TOOTH EXTRACTION       History reviewed. No pertinent family history.  No outpatient medications have been marked as taking for the 7/29/25 encounter (Office Visit) with Rayray Ramos MD.       Tizanidine hcl  Social History     Tobacco Use   Smoking Status Never   Smokeless Tobacco Never      (If patient a smoker, smoking cessation counseling offered)   Social History     Substance and Sexual Activity   Alcohol Use None       REVIEW OF SYSTEMS:  Constitutional: negative  Eyes: negative  Respiratory: negative  Cardiovascular: negative  Gastrointestinal: negative  Genitourinary: see HPI  Musculoskeletal: negative  Skin: negative   Neurological: negative  Hematological/Lymphatic: negative  Psychological: negative      Physical Exam:    This a 70 y.o. male  Vitals:    07/29/25 0932   BP: 114/70     Body mass index is 30.25 kg/m².  Constitutional: Patient in no acute distress;     Assessment and Plan        1. Benign localized prostatic hyperplasia with lower urinary tract symptoms (LUTS)    2. Elevated PSA                 Plan:   Assessment & Plan   Had cystoscopy recently      BPH- on flomax bid. Auass 14 mixed.     Elevated psa- pMRI pirads two. Father with pca hx. Psa and f/u in the spring.           Prescriptions Ordered:  No orders of the defined types were placed in this encounter.     Orders Placed:  No orders of the defined types were placed in this encounter.           RAYRAY RAMOS MD